# Patient Record
Sex: MALE | Race: OTHER | NOT HISPANIC OR LATINO
[De-identification: names, ages, dates, MRNs, and addresses within clinical notes are randomized per-mention and may not be internally consistent; named-entity substitution may affect disease eponyms.]

---

## 2020-11-18 ENCOUNTER — APPOINTMENT (OUTPATIENT)
Dept: INTERNAL MEDICINE | Facility: CLINIC | Age: 55
End: 2020-11-18
Payer: COMMERCIAL

## 2020-11-18 VITALS
DIASTOLIC BLOOD PRESSURE: 88 MMHG | SYSTOLIC BLOOD PRESSURE: 138 MMHG | BODY MASS INDEX: 28.79 KG/M2 | HEIGHT: 68 IN | WEIGHT: 190 LBS

## 2020-11-18 DIAGNOSIS — M17.11 UNILATERAL PRIMARY OSTEOARTHRITIS, RIGHT KNEE: ICD-10-CM

## 2020-11-18 DIAGNOSIS — Z80.0 FAMILY HISTORY OF MALIGNANT NEOPLASM OF DIGESTIVE ORGANS: ICD-10-CM

## 2020-11-18 DIAGNOSIS — Z83.3 FAMILY HISTORY OF DIABETES MELLITUS: ICD-10-CM

## 2020-11-18 DIAGNOSIS — Z87.09 PERSONAL HISTORY OF OTHER DISEASES OF THE RESPIRATORY SYSTEM: ICD-10-CM

## 2020-11-18 DIAGNOSIS — S46.919A STRAIN OF UNSPECIFIED MUSCLE, FASCIA AND TENDON AT SHOULDER AND UPPER ARM LEVEL, UNSPECIFIED ARM, INITIAL ENCOUNTER: ICD-10-CM

## 2020-11-18 DIAGNOSIS — Z87.39 PERSONAL HISTORY OF OTHER DISEASES OF THE MUSCULOSKELETAL SYSTEM AND CONNECTIVE TISSUE: ICD-10-CM

## 2020-11-18 DIAGNOSIS — Z78.9 OTHER SPECIFIED HEALTH STATUS: ICD-10-CM

## 2020-11-18 PROCEDURE — 99213 OFFICE O/P EST LOW 20 MIN: CPT

## 2020-11-18 NOTE — REVIEW OF SYSTEMS
[Fever] : no fever [Chills] : no chills [Chest Pain] : no chest pain [Palpitations] : no palpitations [Shortness Of Breath] : no shortness of breath [Cough] : no cough [Dyspnea on Exertion] : not dyspnea on exertion [Abdominal Pain] : no abdominal pain [Back Pain] : back pain

## 2020-11-18 NOTE — HISTORY OF PRESENT ILLNESS
[de-identified] : 56 y/o male is in the office c/o thoracic back pain for the past few days. Increased with movement and deep breathing. No obvious injury but does a lot of physical work.

## 2020-11-18 NOTE — PLAN
[FreeTextEntry1] : Was taking Tylenol.\par To try Motrin instead along with some degree of rest.\par F/U if not improved or worse

## 2020-11-18 NOTE — PHYSICAL EXAM
[No Respiratory Distress] : no respiratory distress  [Clear to Auscultation] : lungs were clear to auscultation bilaterally [Normal] : normal rate, regular rhythm, normal S1 and S2 and no murmur heard [No Carotid Bruits] : no carotid bruits [de-identified] : Some tenderness along left scapula margin

## 2021-04-05 ENCOUNTER — APPOINTMENT (OUTPATIENT)
Dept: INTERNAL MEDICINE | Facility: CLINIC | Age: 56
End: 2021-04-05
Payer: COMMERCIAL

## 2021-04-05 VITALS
DIASTOLIC BLOOD PRESSURE: 80 MMHG | BODY MASS INDEX: 28.79 KG/M2 | WEIGHT: 190 LBS | HEIGHT: 68 IN | SYSTOLIC BLOOD PRESSURE: 138 MMHG

## 2021-04-05 DIAGNOSIS — Z00.00 ENCOUNTER FOR GENERAL ADULT MEDICAL EXAMINATION W/OUT ABNORMAL FINDINGS: ICD-10-CM

## 2021-04-05 DIAGNOSIS — Z23 ENCOUNTER FOR IMMUNIZATION: ICD-10-CM

## 2021-04-05 PROCEDURE — 90471 IMMUNIZATION ADMIN: CPT

## 2021-04-05 PROCEDURE — 99072 ADDL SUPL MATRL&STAF TM PHE: CPT

## 2021-04-05 PROCEDURE — 99396 PREV VISIT EST AGE 40-64: CPT | Mod: 25

## 2021-04-05 PROCEDURE — 90715 TDAP VACCINE 7 YRS/> IM: CPT

## 2021-04-05 NOTE — REVIEW OF SYSTEMS
[Fever] : no fever [Chills] : no chills [Recent Change In Weight] : ~T no recent weight change [Chest Pain] : no chest pain [Palpitations] : no palpitations [Shortness Of Breath] : no shortness of breath [Abdominal Pain] : no abdominal pain [Diarrhea] : no diarrhea [Muscle Weakness] : no muscle weakness [Joint Swelling] : no joint swelling [Dizziness] : no dizziness [Fainting] : no fainting

## 2021-04-05 NOTE — HISTORY OF PRESENT ILLNESS
[de-identified] : 55 y/o male presents for annual wellness visit and follow-up fasting labs.\par He has a history of hyperlipidemia and hypertension and has been on Lipitor 10 mg and Norvasc 5 mg.  His blood pressure has been well controlled when checked.\par He denies any recent illness and has been generally well.\par He has a history of Covid in April 2020 and he is scheduled for his second Covid vaccine in 2 weeks.

## 2021-04-05 NOTE — PLAN
[FreeTextEntry1] : His exam is unremarkable.\par Fasting labs including PSA was sent.\par \par Hyperlipidemia–he will continue on Lipitor 10 mg for now and the dose can be adjusted based on the results.\par \par Hypertension–his blood pressure is well controlled and he will remain on Norvasc 5 mg daily.\par \par He received Tdap booster today.\par \par He has an appointment scheduled for initial visit for a colonoscopy.

## 2021-04-05 NOTE — PHYSICAL EXAM
[Normal] : no acute distress, well nourished, well developed and well-appearing [No JVD] : no jugular venous distention [No Lymphadenopathy] : no lymphadenopathy [Clear to Auscultation] : lungs were clear to auscultation bilaterally [Normal Rate] : normal rate  [Regular Rhythm] : with a regular rhythm [Non Tender] : non-tender [No Joint Swelling] : no joint swelling [No Focal Deficits] : no focal deficits [Speech Grossly Normal] : speech grossly normal

## 2021-04-06 LAB
ALBUMIN SERPL ELPH-MCNC: 4.6 G/DL
ALP BLD-CCNC: 88 U/L
ALT SERPL-CCNC: 34 U/L
ANION GAP SERPL CALC-SCNC: 12 MMOL/L
AST SERPL-CCNC: 29 U/L
BASOPHILS # BLD AUTO: 0.04 K/UL
BASOPHILS NFR BLD AUTO: 0.6 %
BILIRUB SERPL-MCNC: 0.5 MG/DL
BUN SERPL-MCNC: 11 MG/DL
CALCIUM SERPL-MCNC: 9.3 MG/DL
CHLORIDE SERPL-SCNC: 102 MMOL/L
CHOLEST SERPL-MCNC: 189 MG/DL
CO2 SERPL-SCNC: 24 MMOL/L
CREAT SERPL-MCNC: 1.03 MG/DL
EOSINOPHIL # BLD AUTO: 0.14 K/UL
EOSINOPHIL NFR BLD AUTO: 2.2 %
GLUCOSE SERPL-MCNC: 125 MG/DL
HCT VFR BLD CALC: 45.3 %
HDLC SERPL-MCNC: 52 MG/DL
HGB BLD-MCNC: 15.2 G/DL
IMM GRANULOCYTES NFR BLD AUTO: 0.6 %
LDLC SERPL CALC-MCNC: 111 MG/DL
LYMPHOCYTES # BLD AUTO: 2.9 K/UL
LYMPHOCYTES NFR BLD AUTO: 44.7 %
MAN DIFF?: NORMAL
MCHC RBC-ENTMCNC: 30.7 PG
MCHC RBC-ENTMCNC: 33.6 GM/DL
MCV RBC AUTO: 91.5 FL
MONOCYTES # BLD AUTO: 0.55 K/UL
MONOCYTES NFR BLD AUTO: 8.5 %
NEUTROPHILS # BLD AUTO: 2.82 K/UL
NEUTROPHILS NFR BLD AUTO: 43.4 %
NONHDLC SERPL-MCNC: 137 MG/DL
PLATELET # BLD AUTO: 255 K/UL
POTASSIUM SERPL-SCNC: 4.2 MMOL/L
PROT SERPL-MCNC: 7.2 G/DL
PSA SERPL-MCNC: 0.96 NG/ML
RBC # BLD: 4.95 M/UL
RBC # FLD: 11.8 %
SODIUM SERPL-SCNC: 139 MMOL/L
TRIGL SERPL-MCNC: 133 MG/DL
TSH SERPL-ACNC: 2.41 UIU/ML
WBC # FLD AUTO: 6.49 K/UL

## 2021-04-07 ENCOUNTER — APPOINTMENT (OUTPATIENT)
Dept: GASTROENTEROLOGY | Facility: CLINIC | Age: 56
End: 2021-04-07
Payer: COMMERCIAL

## 2021-04-07 VITALS
SYSTOLIC BLOOD PRESSURE: 135 MMHG | HEIGHT: 68 IN | BODY MASS INDEX: 28.79 KG/M2 | TEMPERATURE: 98 F | DIASTOLIC BLOOD PRESSURE: 95 MMHG | HEART RATE: 73 BPM | WEIGHT: 190 LBS

## 2021-04-07 PROCEDURE — 99202 OFFICE O/P NEW SF 15 MIN: CPT

## 2021-04-07 PROCEDURE — 99072 ADDL SUPL MATRL&STAF TM PHE: CPT

## 2021-04-07 NOTE — HISTORY OF PRESENT ILLNESS
[de-identified] : Mr. MANUELA FELDER is a 56 year old male with history of red blood on the toilet paper which occurs intermittently. Patient never had prior screening. Patient still complains of abdominal pain or bowel changes. Patient's father was diagnosed with colon cancer. No complaints of heartburn.\par

## 2021-04-07 NOTE — ASSESSMENT
[FreeTextEntry1] : 55 yo male screening colonoscopy with family history of colon cancer and history of rectal bleeding.

## 2021-04-08 ENCOUNTER — NON-APPOINTMENT (OUTPATIENT)
Age: 56
End: 2021-04-08

## 2021-04-08 ENCOUNTER — APPOINTMENT (OUTPATIENT)
Dept: OTOLARYNGOLOGY | Facility: CLINIC | Age: 56
End: 2021-04-08
Payer: COMMERCIAL

## 2021-04-08 VITALS
TEMPERATURE: 97.2 F | WEIGHT: 190 LBS | SYSTOLIC BLOOD PRESSURE: 129 MMHG | HEIGHT: 68 IN | DIASTOLIC BLOOD PRESSURE: 84 MMHG | HEART RATE: 80 BPM | BODY MASS INDEX: 28.79 KG/M2

## 2021-04-08 DIAGNOSIS — R51.9 HEADACHE, UNSPECIFIED: ICD-10-CM

## 2021-04-08 DIAGNOSIS — J32.0 CHRONIC MAXILLARY SINUSITIS: ICD-10-CM

## 2021-04-08 DIAGNOSIS — J34.3 HYPERTROPHY OF NASAL TURBINATES: ICD-10-CM

## 2021-04-08 DIAGNOSIS — Z12.11 ENCOUNTER FOR SCREENING FOR MALIGNANT NEOPLASM OF COLON: ICD-10-CM

## 2021-04-08 PROCEDURE — 31231 NASAL ENDOSCOPY DX: CPT

## 2021-04-08 PROCEDURE — 99072 ADDL SUPL MATRL&STAF TM PHE: CPT

## 2021-04-08 PROCEDURE — 99204 OFFICE O/P NEW MOD 45 MIN: CPT | Mod: 25

## 2021-04-08 NOTE — HISTORY OF PRESENT ILLNESS
[de-identified] : ? problem with sinuses.  Does have seasonal allergies.  Sneezes a lot.  Occ feels pain in right side of head with sneeze. Problem started about  6 mos ago.  C/o occ itch in nose.  Itch in eyes and nose.    Occ snores at night acc to wife.  Wife says ? apneic episodes.  Did have recent weight gain.  Does have alternating nasal congestion.

## 2021-04-08 NOTE — REVIEW OF SYSTEMS
[Sneezing] : sneezing [Seasonal Allergies] : seasonal allergies [Nasal Congestion] : nasal congestion [Sinus Pain] : sinus pain [Sinus Pressure] : sinus pressure [Throat Clearing] : throat clearing [Feeling Tired] : feeling tired [Negative] : Heme/Lymph

## 2021-04-08 NOTE — PHYSICAL EXAM
[Nasal Endoscopy Performed] : nasal endoscopy was performed, see procedure section for findings [] : septum deviated to the left [Midline] : trachea located in midline position [Normal] : no rashes [de-identified] : mod inferior turb hypertrophy bilat

## 2021-04-08 NOTE — ASSESSMENT
[FreeTextEntry1] : Patient with c/o sneezing and eye itch with alternating nasal  congestion.  No clinical evidence of sinusitis but patient does have severe right frontal headaches recently especially with sneezing.  Started on flonase and azelastine but will also get CT of sinuses due to concerns about headache. If negative may need neurology\par Patient may also have KWAME - hx of loud snoring and witnessed apnea.  Recommended HST and further treatment pending result.

## 2021-04-14 ENCOUNTER — APPOINTMENT (OUTPATIENT)
Dept: CT IMAGING | Facility: CLINIC | Age: 56
End: 2021-04-14
Payer: COMMERCIAL

## 2021-04-14 ENCOUNTER — OUTPATIENT (OUTPATIENT)
Dept: OUTPATIENT SERVICES | Facility: HOSPITAL | Age: 56
LOS: 1 days | End: 2021-04-14
Payer: COMMERCIAL

## 2021-04-14 DIAGNOSIS — R51.9 HEADACHE, UNSPECIFIED: ICD-10-CM

## 2021-04-14 DIAGNOSIS — J32.0 CHRONIC MAXILLARY SINUSITIS: ICD-10-CM

## 2021-04-14 DIAGNOSIS — G47.33 OBSTRUCTIVE SLEEP APNEA (ADULT) (PEDIATRIC): ICD-10-CM

## 2021-04-14 PROCEDURE — 70486 CT MAXILLOFACIAL W/O DYE: CPT

## 2021-04-14 PROCEDURE — 70486 CT MAXILLOFACIAL W/O DYE: CPT | Mod: 26

## 2021-04-15 ENCOUNTER — NON-APPOINTMENT (OUTPATIENT)
Age: 56
End: 2021-04-15

## 2021-05-29 DIAGNOSIS — Z01.818 ENCOUNTER FOR OTHER PREPROCEDURAL EXAMINATION: ICD-10-CM

## 2021-05-30 ENCOUNTER — APPOINTMENT (OUTPATIENT)
Dept: DISASTER EMERGENCY | Facility: CLINIC | Age: 56
End: 2021-05-30

## 2021-05-30 LAB — SARS-COV-2 N GENE NPH QL NAA+PROBE: NOT DETECTED

## 2021-06-03 ENCOUNTER — APPOINTMENT (OUTPATIENT)
Dept: GASTROENTEROLOGY | Facility: AMBULATORY MEDICAL SERVICES | Age: 56
End: 2021-06-03
Payer: COMMERCIAL

## 2021-06-03 PROCEDURE — 45378 DIAGNOSTIC COLONOSCOPY: CPT

## 2021-12-06 ENCOUNTER — APPOINTMENT (OUTPATIENT)
Dept: INTERNAL MEDICINE | Facility: CLINIC | Age: 56
End: 2021-12-06
Payer: COMMERCIAL

## 2021-12-06 VITALS — HEART RATE: 70 BPM | RESPIRATION RATE: 16 BRPM | SYSTOLIC BLOOD PRESSURE: 136 MMHG | DIASTOLIC BLOOD PRESSURE: 84 MMHG

## 2021-12-06 PROCEDURE — 99213 OFFICE O/P EST LOW 20 MIN: CPT

## 2021-12-06 NOTE — ASSESSMENT
[FreeTextEntry1] : Sinusitis–his symptoms have been persistent.  He also has discolored mucus.  He is given Omnicef 600 mg daily for 1 week.  He was advised rest and was given a note to return to work in 1 week.  He did have a Covid test done recently that was negative as well.

## 2021-12-06 NOTE — HISTORY OF PRESENT ILLNESS
[de-identified] : 56-year-old male presents complaining of persistent sinus and nasal congestion with facial pressure over the past 3 days.  He denies any fever chills but now has increasing discolored mucus production.

## 2021-12-06 NOTE — PHYSICAL EXAM
[No Acute Distress] : no acute distress [Normal Oropharynx] : the oropharynx was normal [No Lymphadenopathy] : no lymphadenopathy [Clear to Auscultation] : lungs were clear to auscultation bilaterally [Normal Rate] : normal rate  [Regular Rhythm] : with a regular rhythm [Normal S1, S2] : normal S1 and S2

## 2021-12-06 NOTE — REVIEW OF SYSTEMS
[Fever] : no fever [Chills] : no chills [Postnasal Drip] : postnasal drip [Nasal Discharge] : nasal discharge [Sore Throat] : no sore throat [Chest Pain] : no chest pain [Shortness Of Breath] : no shortness of breath [Cough] : no cough

## 2021-12-24 ENCOUNTER — RX RENEWAL (OUTPATIENT)
Age: 56
End: 2021-12-24

## 2021-12-29 ENCOUNTER — APPOINTMENT (OUTPATIENT)
Dept: INTERNAL MEDICINE | Facility: CLINIC | Age: 56
End: 2021-12-29
Payer: COMMERCIAL

## 2021-12-29 VITALS — SYSTOLIC BLOOD PRESSURE: 138 MMHG | DIASTOLIC BLOOD PRESSURE: 88 MMHG

## 2021-12-29 VITALS — WEIGHT: 182 LBS | HEIGHT: 68 IN | BODY MASS INDEX: 27.58 KG/M2

## 2021-12-29 DIAGNOSIS — R73.09 OTHER ABNORMAL GLUCOSE: ICD-10-CM

## 2021-12-29 PROCEDURE — 99214 OFFICE O/P EST MOD 30 MIN: CPT | Mod: 25

## 2021-12-29 NOTE — REVIEW OF SYSTEMS
[Fever] : no fever [Chest Pain] : no chest pain [Palpitations] : no palpitations [Abdominal Pain] : no abdominal pain [Headache] : no headache [Dizziness] : no dizziness

## 2021-12-29 NOTE — ASSESSMENT
[FreeTextEntry1] : Hypertension–his blood pressure remains controlled and he will continue with Norvasc 5 mg daily for now.  He was encouraged to monitor at home as well.\par \par Hyperlipidemia–follow-up lipid profile was sent and he will continue with Lipitor 10 mg daily.\par \par Previous elevated glucose–and A1c was sent in addition.  Weight loss with diet and exercise was discussed.\par \par History of Covid–he had Covid in 4/20.  He will be receiving his booster dose later today.

## 2021-12-29 NOTE — HISTORY OF PRESENT ILLNESS
[de-identified] : 57 y/o presents for follow up and fasting labs.\par He has a history of hyperlipidemia and hypertension.\par Has been generally well without any recent illness.

## 2021-12-29 NOTE — HEALTH RISK ASSESSMENT
[Never] : Never [Yes] : Yes [2 - 4 times a month (2 pts)] : 2-4 times a month (2 points) [1 or 2 (0 pts)] : 1 or 2 (0 points) [Never (0 pts)] : Never (0 points) [No] : In the past 12 months have you used drugs other than those required for medical reasons? No [0] : 2) Feeling down, depressed, or hopeless: Not at all (0) [PHQ-2 Negative - No further assessment needed] : PHQ-2 Negative - No further assessment needed [Audit-CScore] : 2 [[id number]] : 0

## 2021-12-30 LAB
ALBUMIN SERPL ELPH-MCNC: 4.7 G/DL
ALP BLD-CCNC: 80 U/L
ALT SERPL-CCNC: 30 U/L
ANION GAP SERPL CALC-SCNC: 17 MMOL/L
AST SERPL-CCNC: 31 U/L
BASOPHILS # BLD AUTO: 0.04 K/UL
BASOPHILS NFR BLD AUTO: 0.7 %
BILIRUB SERPL-MCNC: 0.5 MG/DL
BUN SERPL-MCNC: 14 MG/DL
CALCIUM SERPL-MCNC: 9 MG/DL
CHLORIDE SERPL-SCNC: 104 MMOL/L
CHOLEST SERPL-MCNC: 204 MG/DL
CO2 SERPL-SCNC: 21 MMOL/L
CREAT SERPL-MCNC: 1.01 MG/DL
EOSINOPHIL # BLD AUTO: 0.15 K/UL
EOSINOPHIL NFR BLD AUTO: 2.5 %
ESTIMATED AVERAGE GLUCOSE: 146 MG/DL
GLUCOSE SERPL-MCNC: 116 MG/DL
HBA1C MFR BLD HPLC: 6.7 %
HCT VFR BLD CALC: 44.7 %
HDLC SERPL-MCNC: 52 MG/DL
HGB BLD-MCNC: 15.3 G/DL
IMM GRANULOCYTES NFR BLD AUTO: 0.3 %
LDLC SERPL CALC-MCNC: 112 MG/DL
LYMPHOCYTES # BLD AUTO: 2.45 K/UL
LYMPHOCYTES NFR BLD AUTO: 40.1 %
MAN DIFF?: NORMAL
MCHC RBC-ENTMCNC: 30.9 PG
MCHC RBC-ENTMCNC: 34.2 GM/DL
MCV RBC AUTO: 90.3 FL
MONOCYTES # BLD AUTO: 0.47 K/UL
MONOCYTES NFR BLD AUTO: 7.7 %
NEUTROPHILS # BLD AUTO: 2.98 K/UL
NEUTROPHILS NFR BLD AUTO: 48.7 %
NONHDLC SERPL-MCNC: 152 MG/DL
PLATELET # BLD AUTO: 236 K/UL
POTASSIUM SERPL-SCNC: 4.2 MMOL/L
PROT SERPL-MCNC: 7.3 G/DL
RBC # BLD: 4.95 M/UL
RBC # FLD: 11.8 %
SODIUM SERPL-SCNC: 143 MMOL/L
TRIGL SERPL-MCNC: 201 MG/DL
WBC # FLD AUTO: 6.11 K/UL

## 2022-02-28 ENCOUNTER — APPOINTMENT (OUTPATIENT)
Dept: INTERNAL MEDICINE | Facility: CLINIC | Age: 57
End: 2022-02-28
Payer: COMMERCIAL

## 2022-02-28 VITALS — SYSTOLIC BLOOD PRESSURE: 138 MMHG | DIASTOLIC BLOOD PRESSURE: 86 MMHG

## 2022-02-28 PROCEDURE — 99213 OFFICE O/P EST LOW 20 MIN: CPT

## 2022-02-28 NOTE — ASSESSMENT
[FreeTextEntry1] : Intermittent rectal bleeding/history of hemorrhoids–this has been an ongoing problem but it does seem to be worse recently.  He feels as though the hemorrhoid prolapses during bowel movements.\par He is given Anusol HC suppositories and will use 1 twice daily for the next 7-10 days.\par He is also going to make an appointment with colorectal specialist for further evaluation.

## 2022-02-28 NOTE — HISTORY OF PRESENT ILLNESS
[de-identified] : 57 y/o presents complaining of intermittent rectal bleeding over the past few weeks.  He does have a history of hemorrhoids and they have bled in the past.  He did have a colonoscopy 2021 that did reveal the presence of hemorrhoids.

## 2022-03-04 ENCOUNTER — APPOINTMENT (OUTPATIENT)
Dept: COLORECTAL SURGERY | Facility: CLINIC | Age: 57
End: 2022-03-04
Payer: COMMERCIAL

## 2022-03-04 VITALS
WEIGHT: 180 LBS | BODY MASS INDEX: 27.28 KG/M2 | HEIGHT: 68 IN | HEART RATE: 72 BPM | DIASTOLIC BLOOD PRESSURE: 79 MMHG | TEMPERATURE: 97.1 F | SYSTOLIC BLOOD PRESSURE: 136 MMHG

## 2022-03-04 PROCEDURE — 46221 LIGATION OF HEMORRHOID(S): CPT

## 2022-03-04 PROCEDURE — 99204 OFFICE O/P NEW MOD 45 MIN: CPT | Mod: 25

## 2022-03-04 NOTE — ASSESSMENT
[FreeTextEntry1] : 56M Grade II hemorrhoids, with intermittent prolapse and bleeding. \par Grade 2 left lateral and right posterior hemorrhoids. Grade 1 right anterior hemorrhoid. Rubber band ligation performed on each hemorrhoid column. A total of 3 bands placed.\par \par Plan of care for Hemorrhoids\par Add daily fiber supplementation to diet, Metamucil, Benefiber, or fiber supplement of preference\par Daily over the counter stool softener (eg.Colace) to prevent straining\par Increased fluid intake, preferably water\par limit their intake of spicy foods, fatty foods and alcohol\par Refrain from straining or lingering (eg. reading) on the toilet\par Warm sitz bath after bowel movements, no more than 3/day, do not exceed 10 minutes per sitz bath\par Post band ligation instruction given, if worsening pain, fevers, or trouble urinating, patient to call office to arrange for urgent re-evaluation.\par Patient aware banding may be needed over several office visits\par Follow up in 4 weeks for evaluation.\par \par \par \par \par \par

## 2022-03-04 NOTE — PHYSICAL EXAM
[Abdomen Tenderness] : ~T No ~M abdominal tenderness [Normal rectal exam] : exam was normal [Excoriation] : no perianal excoriation [Fistula] : no fistulas [Tender, Swollen] : nontender, non-swollen [Normal] : was normal [None] : there was no rectal abscess [Gross Blood] : no gross blood [Alert] : alert [Oriented to Person] : oriented to person [Oriented to Place] : oriented to place [Oriented to Time] : oriented to time [Calm] : calm [de-identified] : ALON: Non-tender, enlarged hemorrhoids appreciated [de-identified] : Grade 2 internal hemorrhoids [de-identified] : NAD [de-identified] : Non-labored respiration [de-identified] : Normal rate

## 2022-03-04 NOTE — CONSULT LETTER
[Dear  ___] : Dear  [unfilled], [Consult Letter:] : I had the pleasure of evaluating your patient, [unfilled]. [Please see my note below.] : Please see my note below. [Consult Closing:] : Thank you very much for allowing me to participate in the care of this patient.  If you have any questions, please do not hesitate to contact me. [Sincerely,] : Sincerely, [FreeTextEntry2] : TANNER SMITH [FreeTextEntry3] : Matti Doll MD\par Colon and Rectal Surgery\par

## 2022-03-04 NOTE — PROCEDURE
[FreeTextEntry1] : Anoscopy and rubber band hemorrhoid ligation\par \par I discussed the reason for the procedure, and the risks and benefits with the patient. Risks including bleeding and discomfort were discussed. The patient understood or discussion and would like to proceed with the procedure.\par \par After completing a digital rectal exam a well lubricated anoscope was gently inserted into the anus. Complete inspection was performed. No tenderness on insertion of the anoscope, and the procedure was tolerated well. No fissures, fistula openings, or masses were identified.\par \par Findings: Grade 2 left lateral and right posterior hemorrhoids. Grade 1 right anterior hemorrhoid.\par Rubber band ligation performed on each hemorrhoid column. A total of 3 bands placed\par

## 2022-03-04 NOTE — HISTORY OF PRESENT ILLNESS
[FreeTextEntry1] : 56M who presents today for evaluation of Intermittent rectal bleeding. Patient has a history of hemorrhoid bleeding which has been an ongoing issue. Patient reports hemorrhoidal prolapse during bowel movements which spontaneously reduces. He also reports noticing drops of blood in the toilet and on the toilet paper. Patient denies any pain, denies constipation. Does report eating very spicy foods.\par Colonoscopy 6/2021: Diverticulosis of the sigmoid colon, hemorrhoids\par \par \par \par

## 2022-03-09 ENCOUNTER — APPOINTMENT (OUTPATIENT)
Dept: GASTROENTEROLOGY | Facility: CLINIC | Age: 57
End: 2022-03-09
Payer: COMMERCIAL

## 2022-03-09 VITALS
SYSTOLIC BLOOD PRESSURE: 133 MMHG | DIASTOLIC BLOOD PRESSURE: 76 MMHG | WEIGHT: 180 LBS | BODY MASS INDEX: 27.28 KG/M2 | HEART RATE: 76 BPM | HEIGHT: 68 IN

## 2022-03-09 DIAGNOSIS — K62.5 HEMORRHAGE OF ANUS AND RECTUM: ICD-10-CM

## 2022-03-09 PROCEDURE — 99212 OFFICE O/P EST SF 10 MIN: CPT

## 2022-03-09 NOTE — ASSESSMENT
[FreeTextEntry1] : 57 yo male with history of resolved rectal bleeding after hemorrhoidal banding. Patient advised to continue stool softeners and call with any further bleeding.

## 2022-03-09 NOTE — HISTORY OF PRESENT ILLNESS
[de-identified] : Mr. MANUELA FELDER is a 56 year old male with history of painless rectal bleeding. Patient had colonoscopy in 2021 which demonstrated internal hemorrhoids. Patient had noted some painless rectal bleeding and underwent hemorrhoidal banding by Colorectal Surgery. Bleeding has since resolved.\par

## 2022-03-16 ENCOUNTER — NON-APPOINTMENT (OUTPATIENT)
Age: 57
End: 2022-03-16

## 2022-03-24 ENCOUNTER — RX RENEWAL (OUTPATIENT)
Age: 57
End: 2022-03-24

## 2022-03-25 ENCOUNTER — TRANSCRIPTION ENCOUNTER (OUTPATIENT)
Age: 57
End: 2022-03-25

## 2022-04-06 ENCOUNTER — APPOINTMENT (OUTPATIENT)
Dept: COLORECTAL SURGERY | Facility: CLINIC | Age: 57
End: 2022-04-06
Payer: COMMERCIAL

## 2022-04-06 VITALS
BODY MASS INDEX: 27.58 KG/M2 | SYSTOLIC BLOOD PRESSURE: 142 MMHG | WEIGHT: 182 LBS | TEMPERATURE: 96.8 F | HEIGHT: 68 IN | DIASTOLIC BLOOD PRESSURE: 84 MMHG | HEART RATE: 67 BPM | OXYGEN SATURATION: 97 % | RESPIRATION RATE: 14 BRPM

## 2022-04-06 PROCEDURE — 46221 LIGATION OF HEMORRHOID(S): CPT

## 2022-04-06 PROCEDURE — 99214 OFFICE O/P EST MOD 30 MIN: CPT | Mod: 25

## 2022-04-06 NOTE — PROCEDURE
[FreeTextEntry1] : Anoscopy and rubber band hemorrhoid ligation\par \par I discussed the reason for the procedure, and the risks and benefits with the patient. Risks including bleeding and discomfort were discussed. The patient understood or discussion and would like to proceed with the procedure.\par \par After completing a digital rectal exam a well lubricated anoscope was gently inserted into the anus. Complete inspection was performed. No tenderness on insertion of the anoscope, and the procedure was tolerated well. No fissures, fistula openings, or masses were identified.\par \par Findings: Enlarged left lateral, right posterior and right anterior hemorrhoids. \par Rubber band ligation performed on each hemorrhoid column. A total of 3 bands placed\par

## 2022-04-06 NOTE — HISTORY OF PRESENT ILLNESS
[FreeTextEntry1] : 4/6/2022 - Patient presents today for follow up after hemorrhoidal banding. Patient reports no issues since banding procedure in the office, he denies, prolapse, denies bleeding, denies pain. He has added a stool softener and has increased fiber intake in his diet No other issues reported.\par \par 3/4/2022 - 56M who presents today for evaluation of Intermittent rectal bleeding. Patient has a history of hemorrhoid bleeding which has been an ongoing issue. Patient reports hemorrhoidal prolapse during bowel movements which spontaneously reduces. He also reports noticing drops of blood in the toilet and on the toilet paper. Patient denies any pain, denies constipation. Does report eating very spicy foods.\par Colonoscopy 6/2021: Diverticulosis of the sigmoid colon, hemorrhoids\par \par \par \par

## 2022-04-06 NOTE — PHYSICAL EXAM
[Normal rectal exam] : exam was normal [Normal] : was normal [None] : there was no rectal abscess [Alert] : alert [Oriented to Person] : oriented to person [Oriented to Place] : oriented to place [Oriented to Time] : oriented to time [Calm] : calm [Abdomen Tenderness] : ~T No ~M abdominal tenderness [Excoriation] : no perianal excoriation [Fistula] : no fistulas [Nonprolapsing] : a nonprolapsing (grade I) [Tender, Swollen] : nontender, non-swollen [Gross Blood] : no gross blood [de-identified] : ALON: Non-tender, enlarged hemorrhoids appreciated [de-identified] : Enlarged internal hemorrhoids [de-identified] : NAD [de-identified] : Non-labored respiration [de-identified] : Normal rate

## 2022-04-21 ENCOUNTER — NON-APPOINTMENT (OUTPATIENT)
Age: 57
End: 2022-04-21

## 2022-04-22 ENCOUNTER — NON-APPOINTMENT (OUTPATIENT)
Age: 57
End: 2022-04-22

## 2022-04-22 ENCOUNTER — APPOINTMENT (OUTPATIENT)
Dept: CARDIOLOGY | Facility: CLINIC | Age: 57
End: 2022-04-22
Payer: COMMERCIAL

## 2022-04-22 VITALS
WEIGHT: 178 LBS | HEART RATE: 64 BPM | BODY MASS INDEX: 27.07 KG/M2 | SYSTOLIC BLOOD PRESSURE: 153 MMHG | OXYGEN SATURATION: 96 % | DIASTOLIC BLOOD PRESSURE: 86 MMHG

## 2022-04-22 PROCEDURE — 99245 OFF/OP CONSLTJ NEW/EST HI 55: CPT

## 2022-04-22 PROCEDURE — 93000 ELECTROCARDIOGRAM COMPLETE: CPT

## 2022-04-22 NOTE — HISTORY OF PRESENT ILLNESS
[FreeTextEntry1] : 57 year old male with hx of hypertension hyperlipidemia came for cardiac evaluation , Patient denies any chest pain or shortness of breath , patient does not do regular exercise ,  his blood pressure is elevated , not very compliant to low salt diet , his previous blood pressure readings were also mild elevated ,  patient blood work showed improving lipids , however still elevated on current dose of medication\par \par His ekg done today showed  T wave inversion  which are new compared to old EKG done in 2008 \par \par his blood work showed increase hemoglobin A1c   recent 6.6  patient has family hx of DM \par \par

## 2022-04-22 NOTE — ASSESSMENT
[FreeTextEntry1] : 57 year old male with above hx\par \par \par Abnormal EKG ( new T wave inversion ) without chest pain , possible hypertensive heart disease , rule out hypertrophic heart disease , will obtain echocardiogram to assess ventricular wall thickness and function , will obtain exercise nuclear stress test  ( baseline ST T EKG changes )  to rule out significant ischemia .\par \par \par DM type II , : advised the patient to follow diet restriction , life style modification including exercise , will start him on metformin 500 mg po daily follow up blood work \par \par HTN : uncontrolled ,  encourage the patient to follow low salt diet and increase norvasc to 10 mg po daily , home bP monitoring \par \par Hyperlipidemia : uncontrolled , advised the patient to follow life style modification , including exercise , diet restriction , increase atorvastatin to 20 mg po daily target LDL < 80 .\par \par follow up after above test

## 2022-04-22 NOTE — CARDIOLOGY SUMMARY
[de-identified] : 4/22/22 sinus bradycardia  low Qrs voltage ,  T wave inversion inferior , V4 to V6   ( new compared to ekg in 2008 )

## 2022-04-27 ENCOUNTER — APPOINTMENT (OUTPATIENT)
Dept: INTERNAL MEDICINE | Facility: CLINIC | Age: 57
End: 2022-04-27

## 2022-05-17 ENCOUNTER — NON-APPOINTMENT (OUTPATIENT)
Age: 57
End: 2022-05-17

## 2022-05-18 ENCOUNTER — APPOINTMENT (OUTPATIENT)
Dept: COLORECTAL SURGERY | Facility: CLINIC | Age: 57
End: 2022-05-18
Payer: COMMERCIAL

## 2022-05-18 VITALS
DIASTOLIC BLOOD PRESSURE: 88 MMHG | BODY MASS INDEX: 26.98 KG/M2 | HEART RATE: 60 BPM | WEIGHT: 178 LBS | OXYGEN SATURATION: 98 % | TEMPERATURE: 97.2 F | SYSTOLIC BLOOD PRESSURE: 144 MMHG | RESPIRATION RATE: 15 BRPM | HEIGHT: 68 IN

## 2022-05-18 PROCEDURE — 99213 OFFICE O/P EST LOW 20 MIN: CPT | Mod: 25

## 2022-05-18 PROCEDURE — 46221 LIGATION OF HEMORRHOID(S): CPT

## 2022-05-18 NOTE — HISTORY OF PRESENT ILLNESS
[FreeTextEntry1] : 5/18/2022 - Patient presents today for follow up after hemorrhoidal banding. No issues since banding procedure in the office, he denies any bleeding or pain. Stools have been soft, no constipation or straining. No new complaints\par \par 4/6/2022 - Patient presents today for follow up after hemorrhoidal banding. Patient reports no issues since banding procedure in the office, he denies, prolapse, denies bleeding, denies pain. He has added a stool softener and has increased fiber intake in his diet No other issues reported.\par \par 3/4/2022 - 56M who presents today for evaluation of Intermittent rectal bleeding. Patient has a history of hemorrhoid bleeding which has been an ongoing issue. Patient reports hemorrhoidal prolapse during bowel movements which spontaneously reduces. He also reports noticing drops of blood in the toilet and on the toilet paper. Patient denies any pain, denies constipation. Does report eating very spicy foods.\par Colonoscopy 6/2021: Diverticulosis of the sigmoid colon, hemorrhoids\par \par \par \par

## 2022-05-18 NOTE — ASSESSMENT
[FreeTextEntry1] : 56M Patients symptoms have improved since initial evaluation, Hemorrhoids are smaller on examination today than on prior evaluations.\par \par Findings: Enlarged left lateral, right posterior and right anterior hemorrhoids. \par Rubber band ligation performed on each hemorrhoid column. A total of 3 bands placed\par \par Plan of care for Hemorrhoids\par Continue with daily fiber supplementation to diet, Metamucil, Benefiber, or fiber supplement of preference\par Continue daily over the counter stool softener (eg.Colace) to prevent straining\par Continue increased fluid intake, preferably water\par Refrain from straining or lingering (eg. reading) on the toilet\par Post band ligation instruction given, if worsening pain, fevers, or trouble urinating, patient to call office to arrange for urgent re-evaluation.\par Follow up in 4 weeks for evaluation.\par \par \par \par \par \par

## 2022-05-18 NOTE — PHYSICAL EXAM
[Normal rectal exam] : exam was normal [Nonprolapsing] : a nonprolapsing (grade I) [Normal] : was normal [None] : there was no rectal abscess [Alert] : alert [Oriented to Person] : oriented to person [Oriented to Place] : oriented to place [Oriented to Time] : oriented to time [Calm] : calm [Abdomen Tenderness] : ~T No ~M abdominal tenderness [Excoriation] : no perianal excoriation [Fistula] : no fistulas [Tender, Swollen] : nontender, non-swollen [Gross Blood] : no gross blood [de-identified] : ALON: Non-tender, enlarged hemorrhoids appreciated [de-identified] : Enlarged internal hemorrhoids [de-identified] : NAD [de-identified] : Non-labored respiration [de-identified] : Normal rate

## 2022-05-19 ENCOUNTER — APPOINTMENT (OUTPATIENT)
Dept: CARDIOLOGY | Facility: CLINIC | Age: 57
End: 2022-05-19
Payer: COMMERCIAL

## 2022-05-19 PROCEDURE — 78452 HT MUSCLE IMAGE SPECT MULT: CPT

## 2022-05-19 PROCEDURE — 93015 CV STRESS TEST SUPVJ I&R: CPT

## 2022-05-19 PROCEDURE — A9500: CPT

## 2022-05-23 ENCOUNTER — APPOINTMENT (OUTPATIENT)
Dept: CARDIOLOGY | Facility: CLINIC | Age: 57
End: 2022-05-23
Payer: COMMERCIAL

## 2022-05-23 PROCEDURE — 93306 TTE W/DOPPLER COMPLETE: CPT

## 2022-06-02 ENCOUNTER — APPOINTMENT (OUTPATIENT)
Dept: CARDIOLOGY | Facility: CLINIC | Age: 57
End: 2022-06-02
Payer: COMMERCIAL

## 2022-06-02 ENCOUNTER — NON-APPOINTMENT (OUTPATIENT)
Age: 57
End: 2022-06-02

## 2022-06-02 VITALS — SYSTOLIC BLOOD PRESSURE: 150 MMHG | DIASTOLIC BLOOD PRESSURE: 84 MMHG

## 2022-06-02 VITALS
SYSTOLIC BLOOD PRESSURE: 145 MMHG | BODY MASS INDEX: 26.52 KG/M2 | OXYGEN SATURATION: 98 % | DIASTOLIC BLOOD PRESSURE: 85 MMHG | HEIGHT: 68 IN | WEIGHT: 175 LBS | HEART RATE: 70 BPM

## 2022-06-02 PROCEDURE — 93000 ELECTROCARDIOGRAM COMPLETE: CPT

## 2022-06-02 PROCEDURE — 99215 OFFICE O/P EST HI 40 MIN: CPT

## 2022-06-02 RX ORDER — ERGOCALCIFEROL 1.25 MG/1
1.25 MG CAPSULE ORAL
Qty: 7 | Refills: 0 | Status: ACTIVE | COMMUNITY
Start: 2022-06-02 | End: 1900-01-01

## 2022-06-02 NOTE — HISTORY OF PRESENT ILLNESS
[FreeTextEntry1] : 57 year old male with hx of hypertension hyperlipidemia  abnormal ekg came for follow up after having recommended tests  for new ekg changes ,   echo showed normal study , normal exercise stress test , \par \par   his blood pressure is elevated , not very compliant to low salt diet , his previous blood pressure readings were also mild elevated ,did not taking medication ,   patient blood work showed improving lipids , however still elevated on current dose of medication\par \par His ekg done today showed  T wave inversion  which are new compared to old EKG done in 2008  for which patient had  work up done recently \par \par his blood work showed increase hemoglobin A1c   recent 6.5  patient has family hx of DM , lipid profile  LDL 98 \par very low vitamin D \par \par Patient did not want to have sleep apnea  patient snoring \par

## 2022-06-02 NOTE — ASSESSMENT
[FreeTextEntry1] : 57 year old male with above hx\par \par \par Abnormal EKG ( new T wave inversion ) without chest pain , possible hypertensive heart disease , no obvious LVH on echo , normal ventricular function ,normal exercise stress test , \par \par DM type II , : advised the patient to follow diet restriction , life style modification including exercise , will continue  him on metformin 500 mg po daily follow up blood work \par \par HTN : uncontrolled ,  encourage the patient to follow low salt diet , did not take medication and increase norvasc to 10 mg po daily , home bP monitoring \par \par Hyperlipidemia : uncontrolled , advised the patient to follow life style modification , including exercise , diet restriction , continue  atorvastatin to 20 mg po daily target LDL < 80 .\par \par Low vitamin D : will give high dose vitamin D 50 K weekly for 6 weeks then  1k daily , \par \par snoring : recommend sleep study \par \par follow up after above test

## 2022-06-02 NOTE — CARDIOLOGY SUMMARY
[de-identified] : 6/2/22 sinus bradycardia  low Qrs voltage ,  T wave inversion inferior , V4 to V6   ( no significant change from ekg april 2022 )  [de-identified] : 5/19/22 9 METS 90% MPHR  normal nuclear perfusion scan  [de-identified] : 5/23/22   EF 58%

## 2022-06-03 LAB
25(OH)D3 SERPL-MCNC: 14.8 NG/ML
ALBUMIN SERPL ELPH-MCNC: 4.6 G/DL
ALP BLD-CCNC: 79 U/L
ALT SERPL-CCNC: 20 U/L
ANION GAP SERPL CALC-SCNC: 13 MMOL/L
APPEARANCE: CLEAR
AST SERPL-CCNC: 21 U/L
BASOPHILS # BLD AUTO: 0.05 K/UL
BASOPHILS NFR BLD AUTO: 0.8 %
BILIRUB SERPL-MCNC: 0.4 MG/DL
BILIRUBIN URINE: NEGATIVE
BLOOD URINE: NEGATIVE
BUN SERPL-MCNC: 16 MG/DL
CALCIUM SERPL-MCNC: 8.9 MG/DL
CHLORIDE SERPL-SCNC: 104 MMOL/L
CHOLEST SERPL-MCNC: 171 MG/DL
CO2 SERPL-SCNC: 25 MMOL/L
COLOR: NORMAL
CREAT SERPL-MCNC: 1.03 MG/DL
EGFR: 86 ML/MIN/1.73M2
EOSINOPHIL # BLD AUTO: 0.16 K/UL
EOSINOPHIL NFR BLD AUTO: 2.4 %
ESTIMATED AVERAGE GLUCOSE: 140 MG/DL
GLUCOSE QUALITATIVE U: NEGATIVE
GLUCOSE SERPL-MCNC: 108 MG/DL
HBA1C MFR BLD HPLC: 6.5 %
HCT VFR BLD CALC: 44.7 %
HDLC SERPL-MCNC: 47 MG/DL
HGB BLD-MCNC: 14.8 G/DL
IMM GRANULOCYTES NFR BLD AUTO: 0.5 %
KETONES URINE: NEGATIVE
LDLC SERPL CALC-MCNC: 98 MG/DL
LEUKOCYTE ESTERASE URINE: NEGATIVE
LYMPHOCYTES # BLD AUTO: 2.83 K/UL
LYMPHOCYTES NFR BLD AUTO: 42.5 %
MAN DIFF?: NORMAL
MCHC RBC-ENTMCNC: 30.1 PG
MCHC RBC-ENTMCNC: 33.1 GM/DL
MCV RBC AUTO: 91 FL
MONOCYTES # BLD AUTO: 0.52 K/UL
MONOCYTES NFR BLD AUTO: 7.8 %
NEUTROPHILS # BLD AUTO: 3.07 K/UL
NEUTROPHILS NFR BLD AUTO: 46 %
NITRITE URINE: NEGATIVE
NONHDLC SERPL-MCNC: 124 MG/DL
PH URINE: 6
PLATELET # BLD AUTO: 234 K/UL
POTASSIUM SERPL-SCNC: 4.5 MMOL/L
PROT SERPL-MCNC: 7.1 G/DL
PROTEIN URINE: NEGATIVE
RBC # BLD: 4.91 M/UL
RBC # FLD: 12.2 %
SODIUM SERPL-SCNC: 141 MMOL/L
SPECIFIC GRAVITY URINE: 1.02
TRIGL SERPL-MCNC: 128 MG/DL
TSH SERPL-ACNC: 1.76 UIU/ML
UROBILINOGEN URINE: NORMAL
WBC # FLD AUTO: 6.66 K/UL

## 2022-06-15 ENCOUNTER — APPOINTMENT (OUTPATIENT)
Dept: COLORECTAL SURGERY | Facility: CLINIC | Age: 57
End: 2022-06-15
Payer: COMMERCIAL

## 2022-06-15 VITALS
DIASTOLIC BLOOD PRESSURE: 74 MMHG | SYSTOLIC BLOOD PRESSURE: 113 MMHG | HEIGHT: 68 IN | BODY MASS INDEX: 26.52 KG/M2 | RESPIRATION RATE: 15 BRPM | OXYGEN SATURATION: 96 % | HEART RATE: 63 BPM | TEMPERATURE: 97 F | WEIGHT: 175 LBS

## 2022-06-15 DIAGNOSIS — K64.9 UNSPECIFIED HEMORRHOIDS: ICD-10-CM

## 2022-06-15 PROCEDURE — 46221 LIGATION OF HEMORRHOID(S): CPT

## 2022-06-15 PROCEDURE — 99213 OFFICE O/P EST LOW 20 MIN: CPT | Mod: 25

## 2022-06-15 NOTE — HISTORY OF PRESENT ILLNESS
[FreeTextEntry1] : 6/15/2022 - Patient presents today for follow up. No issues since last seen i the office. Tolerating a normal diet, having soft stools, no episodes of constipation of bleeding.\par \par 5/18/2022 - Patient presents today for follow up after hemorrhoidal banding. No issues since banding procedure in the office, he denies any bleeding or pain. Stools have been soft, no constipation or straining. No new complaints\par \par 4/6/2022 - Patient presents today for follow up after hemorrhoidal banding. Patient reports no issues since banding procedure in the office, he denies, prolapse, denies bleeding, denies pain. He has added a stool softener and has increased fiber intake in his diet No other issues reported.\par \par 3/4/2022 - 56M who presents today for evaluation of Intermittent rectal bleeding. Patient has a history of hemorrhoid bleeding which has been an ongoing issue. Patient reports hemorrhoidal prolapse during bowel movements which spontaneously reduces. He also reports noticing drops of blood in the toilet and on the toilet paper. Patient denies any pain, denies constipation. Does report eating very spicy foods.\par Colonoscopy 6/2021: Diverticulosis of the sigmoid colon, hemorrhoids\par \par \par \par

## 2022-06-15 NOTE — PROCEDURE
[FreeTextEntry1] : Anoscopy and rubber band hemorrhoid ligation\par \par I discussed the reason for the procedure, and the risks and benefits with the patient. Risks including bleeding and discomfort were discussed. The patient understood or discussion and would like to proceed with the procedure.\par \par After completing a digital rectal exam a well lubricated anoscope was gently inserted into the anus. Complete inspection was performed. No tenderness on insertion of the anoscope, and the procedure was tolerated well. No fissures, fistula openings, or masses were identified.\par \par Findings: Enlarged left lateral, right posterior hemorrhoids. \par Rubber band ligation performed on each hemorrhoid column. A total of 2 bands placed\par

## 2022-06-15 NOTE — PHYSICAL EXAM
[Normal rectal exam] : exam was normal [Nonprolapsing] : a nonprolapsing (grade I) [Normal] : was normal [None] : there was no rectal abscess [Alert] : alert [Oriented to Person] : oriented to person [Oriented to Place] : oriented to place [Oriented to Time] : oriented to time [Calm] : calm [Abdomen Tenderness] : ~T No ~M abdominal tenderness [Excoriation] : no perianal excoriation [Fistula] : no fistulas [Tender, Swollen] : nontender, non-swollen [Gross Blood] : no gross blood [de-identified] : ALON: Non-tender, no gross blood [de-identified] : Normal external examination [de-identified] : Enlarged internal hemorrhoids [de-identified] : NAD [de-identified] : Non-labored respiration [de-identified] : Normal rate

## 2022-07-20 ENCOUNTER — APPOINTMENT (OUTPATIENT)
Dept: COLORECTAL SURGERY | Facility: CLINIC | Age: 57
End: 2022-07-20

## 2022-07-20 VITALS
HEIGHT: 68 IN | BODY MASS INDEX: 26.67 KG/M2 | WEIGHT: 176 LBS | TEMPERATURE: 97.8 F | SYSTOLIC BLOOD PRESSURE: 145 MMHG | DIASTOLIC BLOOD PRESSURE: 91 MMHG | OXYGEN SATURATION: 98 % | RESPIRATION RATE: 14 BRPM | HEART RATE: 69 BPM

## 2022-07-20 DIAGNOSIS — K64.1 SECOND DEGREE HEMORRHOIDS: ICD-10-CM

## 2022-07-20 PROCEDURE — 99213 OFFICE O/P EST LOW 20 MIN: CPT | Mod: 25

## 2022-07-20 PROCEDURE — 46221 LIGATION OF HEMORRHOID(S): CPT

## 2022-07-20 NOTE — PHYSICAL EXAM
[Normal rectal exam] : exam was normal [Nonprolapsing] : a nonprolapsing (grade I) [Normal] : was normal [None] : there was no rectal abscess [Alert] : alert [Oriented to Person] : oriented to person [Oriented to Place] : oriented to place [Oriented to Time] : oriented to time [Calm] : calm [Abdomen Tenderness] : ~T No ~M abdominal tenderness [Excoriation] : no perianal excoriation [Fistula] : no fistulas [Tender, Swollen] : nontender, non-swollen [Gross Blood] : no gross blood [de-identified] : ALON: Non-tender, no gross blood [de-identified] : Normal external examination [de-identified] : NAD [de-identified] : Non-labored respiration [de-identified] : Normal rate

## 2022-07-20 NOTE — ASSESSMENT
[FreeTextEntry1] : 56M Patients symptoms have improved since initial evaluation, Hemorrhoids are smaller on examination today than on prior evaluations.\par Findings: small right posterior hemorrhoids. Rubber band ligation performed x 1 \par \par Plan of care for Hemorrhoids\par Continue with daily fiber supplementation to diet, Metamucil, Benefiber, or fiber supplement of preference\par Continue daily over the counter stool softener (eg.Colace) to prevent straining\par Continue increased fluid intake, preferably water\par Refrain from straining or lingering (eg. reading) on the toilet\par Post band ligation instruction given, if worsening pain, fevers, or trouble urinating, patient to call office to arrange for urgent re-evaluation.\par Follow up as needed\par \par \par \par \par \par

## 2022-07-20 NOTE — PROCEDURE
[FreeTextEntry1] : Anoscopy and rubber band hemorrhoid ligation\par \par I discussed the reason for the procedure, and the risks and benefits with the patient. Risks including bleeding and discomfort were discussed. The patient understood or discussion and would like to proceed with the procedure.\par \par After completing a digital rectal exam a well lubricated anoscope was gently inserted into the anus. Complete inspection was performed. No tenderness on insertion of the anoscope, and the procedure was tolerated well. No fissures, fistula openings, or masses were identified.\par \par Findings: small right posterior hemorrhoids. \par Rubber band ligation performed x 1

## 2022-07-20 NOTE — HISTORY OF PRESENT ILLNESS
[FreeTextEntry1] : Patient presents today for follow up. No issues since last seen in the office. Tolerating a normal diet, having soft stools, no episodes of constipation or bleeding.\par \par \par \par

## 2022-08-08 ENCOUNTER — OUTPATIENT (OUTPATIENT)
Dept: OUTPATIENT SERVICES | Facility: HOSPITAL | Age: 57
LOS: 1 days | End: 2022-08-08
Payer: COMMERCIAL

## 2022-08-08 DIAGNOSIS — G47.33 OBSTRUCTIVE SLEEP APNEA (ADULT) (PEDIATRIC): ICD-10-CM

## 2022-08-08 PROCEDURE — 95800 SLP STDY UNATTENDED: CPT

## 2022-09-07 ENCOUNTER — APPOINTMENT (OUTPATIENT)
Dept: FAMILY MEDICINE | Facility: CLINIC | Age: 57
End: 2022-09-07

## 2022-09-07 VITALS
BODY MASS INDEX: 27.43 KG/M2 | DIASTOLIC BLOOD PRESSURE: 62 MMHG | HEART RATE: 60 BPM | OXYGEN SATURATION: 99 % | RESPIRATION RATE: 16 BRPM | HEIGHT: 68 IN | TEMPERATURE: 98.3 F | SYSTOLIC BLOOD PRESSURE: 112 MMHG | WEIGHT: 181 LBS

## 2022-09-07 PROCEDURE — 99396 PREV VISIT EST AGE 40-64: CPT | Mod: 25

## 2022-09-07 PROCEDURE — 36415 COLL VENOUS BLD VENIPUNCTURE: CPT

## 2022-09-07 NOTE — PHYSICAL EXAM
[No Acute Distress] : no acute distress [Well Nourished] : well nourished [Well Developed] : well developed [Well-Appearing] : well-appearing [Normal Voice/Communication] : normal voice/communication [Normal Sclera/Conjunctiva] : normal sclera/conjunctiva [PERRL] : pupils equal round and reactive to light [EOMI] : extraocular movements intact [Normal Oropharynx] : the oropharynx was normal [Supple] : supple [No Respiratory Distress] : no respiratory distress  [Clear to Auscultation] : lungs were clear to auscultation bilaterally [Normal Rate] : normal rate  [Normal S1, S2] : normal S1 and S2 [Soft] : abdomen soft [Non Tender] : non-tender [Normal Bowel Sounds] : normal bowel sounds [Speech Grossly Normal] : speech grossly normal [Normal Affect] : the affect was normal [Normal Mood] : the mood was normal

## 2022-09-07 NOTE — HISTORY OF PRESENT ILLNESS
[FreeTextEntry1] : PE [de-identified] : 56 y/o male presents for annual wellness exam.  Patient notes doing well overall.  No particular questions or concerns today.\par \par History of diabetes.  On metformin 500 mg daily.\par \par Hypertension/hyperlipidemia.  On atorvastatin 20 mg daily and amlodipine 10 mg daily.  Patient is followed by cardiology.\par \par Patient also notes recently having sleep apnea test done.  Results pending.

## 2022-09-07 NOTE — PLAN
[FreeTextEntry1] : 57-year-old male for annual physical exam.  Up-to-date with COVID-vaccine and colonoscopy.  Patient to verify with insurance whether shingles vaccine covered in office.  Recent labs reviewed.\par \par Hypertension/hyperlipidemia.  Well-controlled.  Continue amlodipine and atorvastatin.  Patient has follow-up with cardiology tomorrow.\par \par Vitamin D deficiency.  Repeat level drawn today.  \par \par Diabetes.  Most recent A1c noted to be 6.5 in June 2022 which was an improvement from 6.7 in December 2021.  Patient was started on metformin 500 mg daily April 2022.  Repeat A1c today.\par \par All questions answered.  Patient voiced understanding and agreement above plan.  Return to clinic as recommended.\par

## 2022-09-08 ENCOUNTER — NON-APPOINTMENT (OUTPATIENT)
Age: 57
End: 2022-09-08

## 2022-09-08 ENCOUNTER — APPOINTMENT (OUTPATIENT)
Dept: CARDIOLOGY | Facility: CLINIC | Age: 57
End: 2022-09-08

## 2022-09-08 VITALS
OXYGEN SATURATION: 98 % | BODY MASS INDEX: 27.89 KG/M2 | WEIGHT: 184 LBS | DIASTOLIC BLOOD PRESSURE: 76 MMHG | SYSTOLIC BLOOD PRESSURE: 116 MMHG | HEIGHT: 68 IN | HEART RATE: 62 BPM

## 2022-09-08 LAB
25(OH)D3 SERPL-MCNC: 22.9 NG/ML
ESTIMATED AVERAGE GLUCOSE: 140 MG/DL
HBA1C MFR BLD HPLC: 6.5 %

## 2022-09-08 PROCEDURE — 93000 ELECTROCARDIOGRAM COMPLETE: CPT

## 2022-09-08 PROCEDURE — 99214 OFFICE O/P EST MOD 30 MIN: CPT | Mod: 25

## 2022-09-08 RX ORDER — ZOSTER VACCINE RECOMBINANT, ADJUVANTED 50 MCG/0.5
50 KIT INTRAMUSCULAR ONCE
Qty: 1 | Refills: 0 | Status: DISCONTINUED | COMMUNITY
Start: 2022-09-08 | End: 2022-09-08

## 2022-09-08 NOTE — CARDIOLOGY SUMMARY
[de-identified] : 6/2/22 sinus bradycardia  low Qrs voltage ,  T wave inversion inferior , V4 to V6   ( no significant change from ekg april 2022 )  [de-identified] : 5/19/22 9 METS 90% MPHR  normal nuclear perfusion scan  [de-identified] : 5/23/22   EF 58%

## 2022-09-08 NOTE — HISTORY OF PRESENT ILLNESS
[FreeTextEntry1] : 57 year old male with hx of hypertension hyperlipidemia  abnormal ekg came for follow up says he is doing well , he had physical  yesterday \par his  pressure is improved    patient blood work showed improved lipids on current dose of medication\par \par His ekg done today showed  T wave inversion  which are new compared to old EKG done in 2008  for which patient had  work up done recently \par \par his blood work showed increase hemoglobin A1c   recent 6.5  patient has family hx of DM , lipid profile  LDL 98 \par very low vitamin D \par \par Sleep study moderate sleep apnea  \par \par had  recommended tests  for new ekg changes ,   echo showed normal study , normal exercise stress test ,

## 2022-09-08 NOTE — ASSESSMENT
[FreeTextEntry1] : 57 year old male with above hx\par \par \par Diagnosed to moderate obstructive sleep apnea : would recommended follow up sleep cpap titration study , sleep specialist evaluation \par \par Abnormal EKG ( new T wave inversion ) without chest pain , possible hypertensive heart disease , no obvious LVH on echo , normal ventricular function ,normal exercise stress test , \par \par DM type II , : advised the patient to follow diet restriction , life style modification including exercise , will continue  him on metformin 500 mg po daily follow up blood work \par \par HTN : controlled ,  encourage the patient to follow low salt diet , did not take medication and increase norvasc to 10 mg po daily , home bP monitoring \par \par Hyperlipidemia : uncontrolled , advised the patient to follow life style modification , including exercise , diet restriction , continue  atorvastatin to 20 mg po daily target LDL < 80 .\par \par Low vitamin D : will give high dose vitamin D 50 K weekly for 6 weeks then  1k daily , \par \par \par \par

## 2022-09-14 ENCOUNTER — APPOINTMENT (OUTPATIENT)
Dept: INTERNAL MEDICINE | Facility: CLINIC | Age: 57
End: 2022-09-14

## 2022-09-20 ENCOUNTER — RX RENEWAL (OUTPATIENT)
Age: 57
End: 2022-09-20

## 2022-11-02 ENCOUNTER — NON-APPOINTMENT (OUTPATIENT)
Age: 57
End: 2022-11-02

## 2022-11-02 ENCOUNTER — APPOINTMENT (OUTPATIENT)
Dept: PULMONOLOGY | Facility: CLINIC | Age: 57
End: 2022-11-02

## 2022-11-02 VITALS
HEART RATE: 69 BPM | DIASTOLIC BLOOD PRESSURE: 80 MMHG | SYSTOLIC BLOOD PRESSURE: 126 MMHG | WEIGHT: 185 LBS | OXYGEN SATURATION: 97 % | RESPIRATION RATE: 16 BRPM | HEIGHT: 67.5 IN | BODY MASS INDEX: 28.7 KG/M2

## 2022-11-02 DIAGNOSIS — Z78.9 OTHER SPECIFIED HEALTH STATUS: ICD-10-CM

## 2022-11-02 PROCEDURE — 99204 OFFICE O/P NEW MOD 45 MIN: CPT

## 2022-11-02 NOTE — DISCUSSION/SUMMARY
[FreeTextEntry1] : \par #1. Start autoCPAP to treat moderate KWAME with an AHI of 17.7; encouraged at least 70% compliance \par #2. Replace PAP equipment as needed; ordered 11/2/22\par #3. Pt denies any h/o asthma, COPD or significant smoking hx. \par #4. The patient does not appear to require chronic BD therapy at this time\par #5. Diet and exercise for weight loss \par #6. Pt had both Covid vaccines and boosters; s/p covid infection\par #7. F/u one month after starting CPAP therapy with compliance \par #8. Reviewed risks of exposure and symptoms of Covid-19 virus, including how the virus is spread and precautions to avoid fernando virus. \par \par The patient expressed understanding and agreement with the above recommendations/plan and accepts responsibility to be compliant with recommended testing, therapies, and f/u visits.\par All relevant questions and concerns were addressed. \par Discussed above with patient and wife who was also present.

## 2022-11-02 NOTE — PHYSICAL EXAM
[No Acute Distress] : no acute distress [Normal Appearance] : normal appearance [Supple] : supple [Normal Rate/Rhythm] : normal rate/rhythm [Normal S1, S2] : normal s1, s2 [No Murmurs] : no murmurs [No Resp Distress] : no resp distress [No Acc Muscle Use] : no acc muscle use [Normal Rhythm and Effort] : normal rhythm and effort [Clear to Auscultation Bilaterally] : clear to auscultation bilaterally [Benign] : benign [Not Tender] : not tender [No Clubbing] : no clubbing [No Edema] : no edema [No Focal Deficits] : no focal deficits [Oriented x3] : oriented x3 [TextBox_2] : Pt is overweight

## 2022-11-02 NOTE — CONSULT LETTER
[Dear  ___] : Dear  [unfilled], [Consult Letter:] : I had the pleasure of evaluating your patient, [unfilled]. [Please see my note below.] : Please see my note below. [Consult Closing:] : Thank you very much for allowing me to participate in the care of this patient.  If you have any questions, please do not hesitate to contact me. [Sincerely,] : Sincerely, [FreeTextEntry3] : Sudarshan Cardona MD, FCCP, D. ABSM\par Pulmonary and Sleep Medicine\par [facility] at [place]  [DrFracisco  ___] : Dr. GIVENS

## 2022-11-02 NOTE — HISTORY OF PRESENT ILLNESS
[Never] : never [Initial Evaluation] : an initial evaluation of [Excessive Daytime Sleepiness] : excessive daytime sleepiness [Snoring] : snoring [Unrefreshing Sleep] : unrefreshing sleep [Sleepy When Sedentary] : sleepy when sedentary [Currently Experiencing] : The patient is currently experiencing symptoms. [None] : The patient is not currently being treated for this problem [TextBox_4] : Pt with covid infection 3/2020 with mild symptoms and did not require therapy.\par Denies respiratory complaints.\par Follows with cardiology for abnormal EKG but w/u was normal per pt.\par On Metformin for prediabetes.\par  [Witnessed Apnea During Sleep] : no witnessed apnea during sleep [Witnessed Gasping During Sleep] : no witnessed gasping during sleep [ESS] : 10 [TextBox_11] : 7

## 2022-11-02 NOTE — CONSULT LETTER
[Dear  ___] : Dear  [unfilled], [Consult Letter:] : I had the pleasure of evaluating your patient, [unfilled]. [Please see my note below.] : Please see my note below. [Consult Closing:] : Thank you very much for allowing me to participate in the care of this patient.  If you have any questions, please do not hesitate to contact me. [Sincerely,] : Sincerely, [FreeTextEntry3] : Sudarshan Cardona MD, FCCP, D. ABSM\par Pulmonary and Sleep Medicine\par [facility] at [place]  [DrFracisco  ___] : Dr. GIVENS

## 2022-11-02 NOTE — REASON FOR VISIT
[Initial] : an initial visit [Sleep Apnea] : sleep apnea [Spouse] : spouse [TextBox_44] : weight issues, prior Covid infection

## 2022-12-19 ENCOUNTER — RX RENEWAL (OUTPATIENT)
Age: 57
End: 2022-12-19

## 2023-01-12 ENCOUNTER — APPOINTMENT (OUTPATIENT)
Dept: CARDIOLOGY | Facility: CLINIC | Age: 58
End: 2023-01-12
Payer: COMMERCIAL

## 2023-01-12 ENCOUNTER — NON-APPOINTMENT (OUTPATIENT)
Age: 58
End: 2023-01-12

## 2023-01-12 VITALS
HEART RATE: 77 BPM | BODY MASS INDEX: 28.85 KG/M2 | OXYGEN SATURATION: 98 % | WEIGHT: 186 LBS | DIASTOLIC BLOOD PRESSURE: 90 MMHG | SYSTOLIC BLOOD PRESSURE: 134 MMHG | HEIGHT: 67.5 IN

## 2023-01-12 PROCEDURE — 93000 ELECTROCARDIOGRAM COMPLETE: CPT

## 2023-01-12 PROCEDURE — 99214 OFFICE O/P EST MOD 30 MIN: CPT | Mod: 25

## 2023-01-12 NOTE — CARDIOLOGY SUMMARY
[de-identified] : 6/2/22 sinus bradycardia  low Qrs voltage ,  T wave inversion inferior , V4 to V6   ( no significant change from ekg april 2022 )  [de-identified] : 5/19/22 9 METS 90% MPHR  normal nuclear perfusion scan  [de-identified] : 5/23/22   EF 58%

## 2023-01-12 NOTE — HISTORY OF PRESENT ILLNESS
[FreeTextEntry1] : 57 year old male with hx of hypertension hyperlipidemia  abnormal ekg came for follow up says he is feeling well .\par \par Patient denies any chest pain or shortness of breath or palpitation \par \par \par his  pressure is improved  minimally elevated   patient blood work showed improved lipids on current dose of medication\par \par His ekg  showed  T wave inversion  similar to previous visit ekg   for which patient had  work up done  ,  echo showed normal study , normal exercise stress test , \par \par his blood work showed increase hemoglobin A1c   recent 6.5  patient has family hx of DM , lipid profile  LDL 98 \par very low vitamin D \par \par Sleep study moderate sleep apnea   have evaluated by sleep medicine  , awaiting for delivery cpap machine

## 2023-01-12 NOTE — ASSESSMENT
[FreeTextEntry1] : 57 year old male with above hx\par \par \par Diagnosed to moderate obstructive sleep apnea : s/p sleep specialist evaluation , encourage to use cpap machine regularly \par \par Abnormal EKG ( stable T wave inversion ) without chest pain , possible hypertensive heart disease , no obvious LVH on echo , normal ventricular function ,normal exercise stress test , \par \par DM type II , : advised the patient to follow diet restriction , life style modification including exercise , will continue  him on metformin 500 mg po bid follow up blood work \par \par HTN : controlled ,  encourage the patient to follow low salt diet ,  continue norvasc  10 mg po daily , home bP monitoring \par \par Hyperlipidemia : uncontrolled , advised the patient to follow life style modification , including exercise , diet restriction , continue  atorvastatin to 20 mg po daily target LDL < 80 .\par \par Low vitamin D : will give high dose vitamin D 50 K weekly for 6 weeks then  1k daily , \par \par \par \par

## 2023-02-01 ENCOUNTER — APPOINTMENT (OUTPATIENT)
Dept: FAMILY MEDICINE | Facility: CLINIC | Age: 58
End: 2023-02-01
Payer: COMMERCIAL

## 2023-02-01 PROCEDURE — 99213 OFFICE O/P EST LOW 20 MIN: CPT

## 2023-02-01 NOTE — PLAN
[FreeTextEntry1] : 57-year-old male for follow-up.\par \par Hypertension/hyperlipidemia. Controlled. Continue amlodipine and atorvastatin. Patient follows with cardiology.\par \par Vitamin D deficiency. Repeat level to be drawn.\par \par Diabetes. Most recent A1c noted to be 6.5 in September 2022 which was unchanged from prior level June 2022.   Patient was started on metformin 500 mg daily April 2022.  This was increased to 500 mg of metformin twice daily in September 2022.  Repeat A1c to to be obtained.\par \par Forms for FMLA to be done.\par \par All questions answered. Patient voiced understanding and in agreement to above plan. Return to clinic as recommended.

## 2023-02-01 NOTE — HISTORY OF PRESENT ILLNESS
[FreeTextEntry1] : Patient presents to discuss FMLA.  [de-identified] : 57-year-old male for follow-up.\par \par History of diabetes. On metformin 500 mg  BID.  Was taking 500 mg daily up to most recent A1c drawn in September 2022. Level of 6.5 ordered which was unchanged from previous level few months prior.\par \par Hypertension/hyperlipidemia. On atorvastatin 20 mg daily and amlodipine 10 mg daily. Patient is followed by cardiology.\par \par Additionally, patient requesting to have FMLA forms filled out to care for his mother.

## 2023-02-08 ENCOUNTER — APPOINTMENT (OUTPATIENT)
Dept: FAMILY MEDICINE | Facility: CLINIC | Age: 58
End: 2023-02-08
Payer: COMMERCIAL

## 2023-02-08 PROCEDURE — 36415 COLL VENOUS BLD VENIPUNCTURE: CPT

## 2023-02-09 LAB
ESTIMATED AVERAGE GLUCOSE: 137 MG/DL
HBA1C MFR BLD HPLC: 6.4 %

## 2023-03-15 ENCOUNTER — APPOINTMENT (OUTPATIENT)
Dept: PULMONOLOGY | Facility: CLINIC | Age: 58
End: 2023-03-15
Payer: COMMERCIAL

## 2023-03-15 VITALS
OXYGEN SATURATION: 96 % | HEIGHT: 67.5 IN | DIASTOLIC BLOOD PRESSURE: 62 MMHG | SYSTOLIC BLOOD PRESSURE: 112 MMHG | WEIGHT: 175 LBS | HEART RATE: 70 BPM | BODY MASS INDEX: 27.15 KG/M2 | RESPIRATION RATE: 16 BRPM

## 2023-03-15 PROCEDURE — 99214 OFFICE O/P EST MOD 30 MIN: CPT

## 2023-03-15 NOTE — PHYSICAL EXAM
[No Acute Distress] : no acute distress [Normal Appearance] : normal appearance [Supple] : supple [Normal Rate/Rhythm] : normal rate/rhythm [Normal S1, S2] : normal s1, s2 [No Murmurs] : no murmurs [No Resp Distress] : no resp distress [No Acc Muscle Use] : no acc muscle use [Normal Rhythm and Effort] : normal rhythm and effort [Clear to Auscultation Bilaterally] : clear to auscultation bilaterally [Benign] : benign [Not Tender] : not tender [No Clubbing] : no clubbing [No Edema] : no edema [Oriented x3] : oriented x3 [No Focal Deficits] : no focal deficits [TextBox_2] : Pt is overweight

## 2023-03-15 NOTE — HISTORY OF PRESENT ILLNESS
[Excessive Daytime Sleepiness] : excessive daytime sleepiness [Snoring] : snoring [Unrefreshing Sleep] : unrefreshing sleep [Sleepy When Sedentary] : sleepy when sedentary [Currently Experiencing] : The patient is currently experiencing symptoms. [None] : The patient is not currently being treated for this problem [TextBox_4] : Pt with covid infection 3/2020 with mild symptoms and did not require therapy.\par Denies respiratory complaints.\par Follows with cardiology for abnormal EKG but w/u was normal per pt.\par On Metformin for prediabetes.\par  [ESS] : 10 [TextBox_11] : 7 [Follow-Up - Routine Clinic] : a routine clinic follow-up of [Witnessed Apnea During Sleep] : no witnessed apnea during sleep [Witnessed Gasping During Sleep] : no witnessed gasping during sleep

## 2023-03-15 NOTE — REASON FOR VISIT
[Sleep Apnea] : sleep apnea [Spouse] : spouse [Follow-Up] : a follow-up visit [TextBox_44] : weight issues, prior Covid infection

## 2023-03-15 NOTE — DISCUSSION/SUMMARY
[FreeTextEntry1] : \par #1. Continue autoCPAP to treat moderate KWAME with an AHI of 17.7; encouraged at least 70% compliance \par #2. Replace PAP equipment as needed; ordered 11/2/22\par #3. Pt denies any h/o asthma, COPD or significant smoking hx. \par #4. The patient does not appear to require chronic BD therapy at this time\par #5. Diet and exercise for weight loss \par #6. Pt had both Covid vaccines and boosters; s/p covid infection\par #7. F/u in 4-6 weeks with compliance \par #8. Reviewed risks of exposure and symptoms of Covid-19 virus, including how the virus is spread and precautions to avoid fernando virus. \par \par The patient expressed understanding and agreement with the above recommendations/plan and accepts responsibility to be compliant with recommended testing, therapies, and f/u visits.\par All relevant questions and concerns were addressed. \par Discussed above with patient and wife who was also present.

## 2023-03-15 NOTE — CONSULT LETTER
[Dear  ___] : Dear  [unfilled], [Consult Letter:] : I had the pleasure of evaluating your patient, [unfilled]. [Please see my note below.] : Please see my note below. [Consult Closing:] : Thank you very much for allowing me to participate in the care of this patient.  If you have any questions, please do not hesitate to contact me. [Sincerely,] : Sincerely, [DrFracisco  ___] : Dr. GIVENS [FreeTextEntry3] : Sudarshan Cardona MD, FCCP, D. ABSM\par Pulmonary and Sleep Medicine\par [facility] at [place]

## 2023-04-21 ENCOUNTER — APPOINTMENT (OUTPATIENT)
Dept: FAMILY MEDICINE | Facility: CLINIC | Age: 58
End: 2023-04-21
Payer: COMMERCIAL

## 2023-04-21 VITALS
TEMPERATURE: 97.4 F | HEIGHT: 68 IN | OXYGEN SATURATION: 99 % | SYSTOLIC BLOOD PRESSURE: 142 MMHG | BODY MASS INDEX: 27.13 KG/M2 | DIASTOLIC BLOOD PRESSURE: 88 MMHG | WEIGHT: 179 LBS | HEART RATE: 91 BPM

## 2023-04-21 DIAGNOSIS — H10.9 UNSPECIFIED CONJUNCTIVITIS: ICD-10-CM

## 2023-04-21 PROCEDURE — 99213 OFFICE O/P EST LOW 20 MIN: CPT

## 2023-04-21 NOTE — PHYSICAL EXAM
[No Acute Distress] : no acute distress [Well Nourished] : well nourished [Well Developed] : well developed [Well-Appearing] : well-appearing [Normal Voice/Communication] : normal voice/communication [PERRL] : pupils equal round and reactive to light [EOMI] : extraocular movements intact [Normal Oropharynx] : the oropharynx was normal [Supple] : supple [No Respiratory Distress] : no respiratory distress  [Clear to Auscultation] : lungs were clear to auscultation bilaterally [Normal Rate] : normal rate  [Normal S1, S2] : normal S1 and S2 [Soft] : abdomen soft [Non Tender] : non-tender [Normal Bowel Sounds] : normal bowel sounds [Speech Grossly Normal] : speech grossly normal [Normal Affect] : the affect was normal [Normal Mood] : the mood was normal [de-identified] : Erythematous conjunctiva

## 2023-04-21 NOTE — HISTORY OF PRESENT ILLNESS
[FreeTextEntry8] : 58-year-old male for conjunctivitis.  Was sent home from work today.  Left eye redness and discharge noted.  No vision disturbances.

## 2023-04-21 NOTE — REVIEW OF SYSTEMS
[Fever] : no fever [Fatigue] : no fatigue [Discharge] : discharge [Redness] : redness [Earache] : no earache [Chest Pain] : no chest pain [Palpitations] : no palpitations [Shortness Of Breath] : no shortness of breath [Cough] : no cough [Abdominal Pain] : no abdominal pain [Dysuria] : no dysuria [Headache] : no headache

## 2023-04-21 NOTE — PLAN
[FreeTextEntry1] : 58-year-old male for conjunctivitis.  Erythromycin as prescribed.  Signs and symptoms warranting further eval advised.  All questions answered.  Patient voiced understanding and in agreement to plan.  Return to clinic as recommended.

## 2023-04-28 ENCOUNTER — APPOINTMENT (OUTPATIENT)
Dept: PULMONOLOGY | Facility: CLINIC | Age: 58
End: 2023-04-28
Payer: COMMERCIAL

## 2023-04-28 VITALS
DIASTOLIC BLOOD PRESSURE: 84 MMHG | HEART RATE: 89 BPM | BODY MASS INDEX: 27.13 KG/M2 | WEIGHT: 179 LBS | RESPIRATION RATE: 16 BRPM | OXYGEN SATURATION: 98 % | HEIGHT: 68 IN | SYSTOLIC BLOOD PRESSURE: 126 MMHG

## 2023-04-28 PROCEDURE — 99214 OFFICE O/P EST MOD 30 MIN: CPT

## 2023-04-28 NOTE — DISCUSSION/SUMMARY
[FreeTextEntry1] : \par #1. Continue autoCPAP to treat moderate KWAME with an AHI of 17.7; encouraged at least 70% compliance \par #2. Replace PAP equipment as needed; ordered 4/28/23\par #3. Pt denies any h/o asthma, COPD or significant smoking hx. \par #4. The patient does not appear to require chronic BD therapy at this time\par #5. Diet and exercise for weight loss \par #6. Pt had both Covid vaccines and boosters; s/p covid infection\par #7. F/u in 1 month weeks with compliance \par #8. Reviewed risks of exposure and symptoms of Covid-19 virus, including how the virus is spread and precautions to avoid fernando virus. \par \par The patient expressed understanding and agreement with the above recommendations/plan and accepts responsibility to be compliant with recommended testing, therapies, and f/u visits.\par All relevant questions and concerns were addressed. \par Discussed above with patient and wife who was also present.

## 2023-04-28 NOTE — HISTORY OF PRESENT ILLNESS
[Follow-Up - Routine Clinic] : a routine clinic follow-up of [Excessive Daytime Sleepiness] : excessive daytime sleepiness [Snoring] : snoring [Unrefreshing Sleep] : unrefreshing sleep [Sleepy When Sedentary] : sleepy when sedentary [Currently Experiencing] : The patient is currently experiencing symptoms. [None] : The patient is not currently being treated for this problem [TextBox_4] : Never smoker.\par Pt with covid infection 3/2020 with mild symptoms and did not require therapy. Pt s/p Covid infection again 4/2022 with rhinorrhea, nasal symptoms and was treated with Molnupiravir.\par Denies respiratory complaints.\par Follows with cardiology for abnormal EKG but w/u was normal per pt.\par On Metformin for prediabetes.\par \par  [ESS] : 10 [TextBox_11] : 7 [Witnessed Apnea During Sleep] : no witnessed apnea during sleep [Witnessed Gasping During Sleep] : no witnessed gasping during sleep

## 2023-04-28 NOTE — CONSULT LETTER
[Dear  ___] : Dear  [unfilled], [Consult Letter:] : I had the pleasure of evaluating your patient, [unfilled]. [Please see my note below.] : Please see my note below. [Consult Closing:] : Thank you very much for allowing me to participate in the care of this patient.  If you have any questions, please do not hesitate to contact me. [Sincerely,] : Sincerely, [DrFracisco  ___] : Dr. GIVENS [FreeTextEntry3] : Sudarshan Cardona MD, FCCP, D. ABSM\par Pulmonary and Sleep Medicine\par [facility] at [place]

## 2023-04-28 NOTE — REASON FOR VISIT
[Follow-Up] : a follow-up visit [Sleep Apnea] : sleep apnea [Spouse] : spouse [TextBox_44] : weight issues, prior Covid infection

## 2023-05-26 ENCOUNTER — APPOINTMENT (OUTPATIENT)
Dept: CARDIOLOGY | Facility: CLINIC | Age: 58
End: 2023-05-26
Payer: COMMERCIAL

## 2023-05-26 ENCOUNTER — NON-APPOINTMENT (OUTPATIENT)
Age: 58
End: 2023-05-26

## 2023-05-26 ENCOUNTER — APPOINTMENT (OUTPATIENT)
Dept: CARDIOLOGY | Facility: CLINIC | Age: 58
End: 2023-05-26

## 2023-05-26 VITALS
HEART RATE: 73 BPM | HEIGHT: 68 IN | WEIGHT: 183 LBS | OXYGEN SATURATION: 96 % | BODY MASS INDEX: 27.74 KG/M2 | DIASTOLIC BLOOD PRESSURE: 88 MMHG | SYSTOLIC BLOOD PRESSURE: 120 MMHG

## 2023-05-26 VITALS — DIASTOLIC BLOOD PRESSURE: 90 MMHG | SYSTOLIC BLOOD PRESSURE: 130 MMHG

## 2023-05-26 PROCEDURE — 93000 ELECTROCARDIOGRAM COMPLETE: CPT

## 2023-05-26 PROCEDURE — 99214 OFFICE O/P EST MOD 30 MIN: CPT | Mod: 25

## 2023-05-26 RX ORDER — HYDROCORTISONE ACETATE 25 MG/1
25 SUPPOSITORY RECTAL
Qty: 14 | Refills: 0 | Status: DISCONTINUED | COMMUNITY
Start: 2022-02-28 | End: 2023-05-26

## 2023-05-30 NOTE — ASSESSMENT
[FreeTextEntry1] : 57 year old male with above hx\par \par Diagnosed to moderate obstructive sleep apnea : s/p sleep specialist evaluation , encourage to use cpap machine regularly \par \par Abnormal EKG ( stable T wave inversion ) without chest pain , possible hypertensive heart disease , no obvious LVH on echo , normal ventricular function ,normal exercise stress test , \par \par DM type II , : advised the patient to follow diet restriction , life style modification including exercise , will continue  him on metformin 500 mg po bid monitor labs \par \par HTN : mildly elevated.  Add HCTZ 12.5mg daily.  encourage the patient to follow low salt diet ,  continue norvasc  10 mg po daily , home bP monitoring \par \par Hyperlipidemia : No recent lipid panel.  Labs ordered.  advised the patient to follow life style modification , including exercise , diet restriction , continue  atorvastatin to 20 mg po daily target LDL < 80 .\par \par Low vitamin D : advised to start high dose vitamin D 50 K weekly for 6 weeks previously recommended at last visit, then  1k daily , \par \par \par Follow up 4 months\par \par

## 2023-05-30 NOTE — HISTORY OF PRESENT ILLNESS
[FreeTextEntry1] : 58 year old male with hx of hypertension hyperlipidemia  abnormal ekg came for follow up says he is feeling well .\par \par Patient denies any chest pain or shortness of breath or palpitation \par \par His  pressure is borderline.   Patient blood work showed improved lipids on current dose of medication\par \par His ekg  showed  T wave inversion  similar to previous visit ekg   for which patient had  work up done  ,  echo showed normal study , normal exercise stress test , \par \par his blood work showed hemoglobin A1c  6.4  patient has family hx of DM , lipid profile  LDL 98 \par very low vitamin D.  Not taking rx vitamin D as prescribed\par \par Sleep study moderate sleep apnea   have evaluated by sleep medicine  , using cpap machine but removes in the middle of the night due to itching. Then he replaces it.

## 2023-05-30 NOTE — CARDIOLOGY SUMMARY
[de-identified] : 6/2/22 sinus bradycardia  low Qrs voltage ,  T wave inversion inferior , V4 to V6   ( no significant change from ekg april 2022 )  [de-identified] : 5/19/22 9 METS 90% MPHR  normal nuclear perfusion scan  [de-identified] : 5/23/22   EF 58%

## 2023-06-14 ENCOUNTER — APPOINTMENT (OUTPATIENT)
Dept: PULMONOLOGY | Facility: CLINIC | Age: 58
End: 2023-06-14
Payer: COMMERCIAL

## 2023-06-14 VITALS
RESPIRATION RATE: 16 BRPM | BODY MASS INDEX: 27.74 KG/M2 | SYSTOLIC BLOOD PRESSURE: 138 MMHG | WEIGHT: 183 LBS | DIASTOLIC BLOOD PRESSURE: 88 MMHG | HEART RATE: 71 BPM | OXYGEN SATURATION: 97 % | HEIGHT: 68 IN

## 2023-06-14 DIAGNOSIS — J32.9 CHRONIC SINUSITIS, UNSPECIFIED: ICD-10-CM

## 2023-06-14 DIAGNOSIS — Z86.16 PERSONAL HISTORY OF COVID-19: ICD-10-CM

## 2023-06-14 DIAGNOSIS — J30.9 ALLERGIC RHINITIS, UNSPECIFIED: ICD-10-CM

## 2023-06-14 DIAGNOSIS — E66.3 OVERWEIGHT: ICD-10-CM

## 2023-06-14 PROCEDURE — 99214 OFFICE O/P EST MOD 30 MIN: CPT

## 2023-06-14 NOTE — CONSULT LETTER
[Dear  ___] : Dear  [unfilled], [Consult Letter:] : I had the pleasure of evaluating your patient, [unfilled]. [Please see my note below.] : Please see my note below. [Sincerely,] : Sincerely, [Consult Closing:] : Thank you very much for allowing me to participate in the care of this patient.  If you have any questions, please do not hesitate to contact me. [DrrFacisco  ___] : Dr. GIVENS [FreeTextEntry3] : Sudarshan Cardona MD, FCCP, D. ABSM\par Pulmonary and Sleep Medicine\par [facility] at [place]

## 2023-06-14 NOTE — DISCUSSION/SUMMARY
[FreeTextEntry1] : \par #1. Continue autoCPAP to treat moderate KWAME with an AHI of 17.7; encouraged at least 70% compliance \par #2. Replace PAP equipment as needed; ordered 4/28/23\par #3. Pt denies any h/o asthma, COPD or significant smoking hx. \par #4. The patient does not appear to require chronic BD therapy at this time\par #5. Diet and exercise for weight loss \par #6. Pt had both Covid vaccines and boosters; s/p covid infection\par #7. F/u in 6 weeks with compliance \par #8. Reviewed risks of exposure and symptoms of Covid-19 virus, including how the virus is spread and precautions to avoid fernando virus. \par \par The patient expressed understanding and agreement with the above recommendations/plan and accepts responsibility to be compliant with recommended testing, therapies, and f/u visits.\par All relevant questions and concerns were addressed. \par Discussed above with patient and wife who was also present.

## 2023-07-25 ENCOUNTER — APPOINTMENT (OUTPATIENT)
Dept: COLORECTAL SURGERY | Facility: CLINIC | Age: 58
End: 2023-07-25
Payer: COMMERCIAL

## 2023-07-25 VITALS
BODY MASS INDEX: 27.58 KG/M2 | HEIGHT: 68 IN | TEMPERATURE: 97.5 F | RESPIRATION RATE: 15 BRPM | HEART RATE: 69 BPM | SYSTOLIC BLOOD PRESSURE: 140 MMHG | OXYGEN SATURATION: 98 % | DIASTOLIC BLOOD PRESSURE: 87 MMHG | WEIGHT: 182 LBS

## 2023-07-25 DIAGNOSIS — K64.8 OTHER HEMORRHOIDS: ICD-10-CM

## 2023-07-25 PROCEDURE — 99213 OFFICE O/P EST LOW 20 MIN: CPT | Mod: 25

## 2023-07-25 PROCEDURE — 46221 LIGATION OF HEMORRHOID(S): CPT

## 2023-07-25 NOTE — ASSESSMENT
[FreeTextEntry1] : 58M Bleeding internal hemorrhoids. Findings on anoscopy: prominent internal hemorrhoids. Banding performed, LL x 1, RP  x 1, RA x 1\par \par Plan of care for Hemorrhoids\par Add daily fiber supplementation to diet, Metamucil, Benefiber, or fiber supplement of preference\par Daily over the counter stool softener (eg.Colace) to prevent straining\par Increased fluid intake, preferably water\par Refrain from straining or lingering (eg. reading) on the toilet\par Warm sitz bath after bowel movements, no more than 3/day, do not exceed 10 minutes per sitz bath\par Post band ligation instruction given - For worsening pain, fevers, or trouble urinating, patient advised to call office to arrange for urgent re-evaluation.\par Patient will follow up in 4 weeks\par

## 2023-07-25 NOTE — HISTORY OF PRESENT ILLNESS
[FreeTextEntry1] : 58 year old male last seen 1 year ago for hemorrhoids presents for follow up evaluation after noticing BRBPR over the last few weeks. He reported the symptoms were mostly with bowel movements, but he experienced one episode while passing gas. Patient denies any pain, and at this time he has had no episodes over the past 3 days. He denies constipation/straining/hard stools. No other symptoms reported.\par \par \par \par

## 2023-07-25 NOTE — PHYSICAL EXAM
[Normal rectal exam] : exam was normal [Nonprolapsing] : a nonprolapsing (grade I) [Normal] : was normal [None] : there was no rectal abscess [Alert] : alert [Oriented to Person] : oriented to person [Oriented to Place] : oriented to place [Oriented to Time] : oriented to time [Calm] : calm [Abdomen Tenderness] : ~T No ~M abdominal tenderness [Excoriation] : no perianal excoriation [Fistula] : no fistulas [Tender, Swollen] : nontender, non-swollen [Gross Blood] : no gross blood [de-identified] : ALON: Nontender, no gross blood, prominent internal hemorrhoids [de-identified] : Normal external examination [de-identified] : NAD [de-identified] : Normal rate [de-identified] : Non-labored respiration

## 2023-07-25 NOTE — PROCEDURE
[FreeTextEntry1] : Anoscopy and rubber band hemorrhoid ligation\par \par I discussed the reason for the procedure, and the risks and benefits with the patient. Risks including bleeding and discomfort were discussed. The patient understood or discussion and would like to proceed with the procedure.\par \par After completing a digital rectal exam a well lubricated anoscope was gently inserted into the anus. Complete inspection was performed. No tenderness on insertion of the anoscope, and the procedure was tolerated well. No fissures, fistula openings, or masses were identified.\par \par Findings: prominent internal hemorrhoids. Banding performed, LL x 1, RP  x 1, RA x 1

## 2023-08-07 ENCOUNTER — APPOINTMENT (OUTPATIENT)
Dept: FAMILY MEDICINE | Facility: CLINIC | Age: 58
End: 2023-08-07
Payer: COMMERCIAL

## 2023-08-07 ENCOUNTER — RESULT REVIEW (OUTPATIENT)
Age: 58
End: 2023-08-07

## 2023-08-07 VITALS
HEART RATE: 80 BPM | SYSTOLIC BLOOD PRESSURE: 140 MMHG | HEIGHT: 68 IN | TEMPERATURE: 98 F | OXYGEN SATURATION: 98 % | BODY MASS INDEX: 27.58 KG/M2 | DIASTOLIC BLOOD PRESSURE: 90 MMHG | WEIGHT: 182 LBS

## 2023-08-07 PROCEDURE — 99213 OFFICE O/P EST LOW 20 MIN: CPT

## 2023-08-07 RX ORDER — MELOXICAM 7.5 MG/1
7.5 TABLET ORAL TWICE DAILY
Qty: 60 | Refills: 0 | Status: ACTIVE | COMMUNITY
Start: 2023-08-07 | End: 1900-01-01

## 2023-08-07 NOTE — HISTORY OF PRESENT ILLNESS
[FreeTextEntry1] : Back pain [de-identified] : 58-year-old male for acute back pain since this AM.  Patient notes radiation down legs.  Sensation intact.  No incontinence noted.  No injuries noted.

## 2023-08-07 NOTE — PHYSICAL EXAM
[No Acute Distress] : no acute distress [Well Nourished] : well nourished [Well Developed] : well developed [Well-Appearing] : well-appearing [Normal Voice/Communication] : normal voice/communication [Normal Oropharynx] : the oropharynx was normal [Supple] : supple [No Respiratory Distress] : no respiratory distress  [Clear to Auscultation] : lungs were clear to auscultation bilaterally [Normal Rate] : normal rate  [Normal S1, S2] : normal S1 and S2 [Soft] : abdomen soft [Non Tender] : non-tender [Normal Bowel Sounds] : normal bowel sounds [Speech Grossly Normal] : speech grossly normal [Normal Affect] : the affect was normal [Normal Mood] : the mood was normal

## 2023-08-07 NOTE — PLAN
[FreeTextEntry1] : 58-year-old male for back pain.  Possible sciatica.  X-ray ordered.  Meloxicam as needed.  Signs and symptoms warranting further eval advised.  All questions answered.  Patient voiced understanding and in agreement to plan.  Return to clinic as recommended.

## 2023-08-07 NOTE — REVIEW OF SYSTEMS
[Discharge] : discharge [Redness] : redness [Back Pain] : back pain [Fever] : no fever [Fatigue] : no fatigue [Earache] : no earache [Chest Pain] : no chest pain [Palpitations] : no palpitations [Shortness Of Breath] : no shortness of breath [Cough] : no cough [Abdominal Pain] : no abdominal pain [Dysuria] : no dysuria [Headache] : no headache

## 2023-08-08 ENCOUNTER — APPOINTMENT (OUTPATIENT)
Dept: RADIOLOGY | Facility: CLINIC | Age: 58
End: 2023-08-08
Payer: COMMERCIAL

## 2023-08-08 ENCOUNTER — OUTPATIENT (OUTPATIENT)
Dept: OUTPATIENT SERVICES | Facility: HOSPITAL | Age: 58
LOS: 1 days | End: 2023-08-08
Payer: COMMERCIAL

## 2023-08-08 DIAGNOSIS — M54.9 DORSALGIA, UNSPECIFIED: ICD-10-CM

## 2023-08-08 PROCEDURE — 72100 X-RAY EXAM L-S SPINE 2/3 VWS: CPT

## 2023-08-08 PROCEDURE — 72100 X-RAY EXAM L-S SPINE 2/3 VWS: CPT | Mod: 26

## 2023-08-17 ENCOUNTER — APPOINTMENT (OUTPATIENT)
Dept: FAMILY MEDICINE | Facility: CLINIC | Age: 58
End: 2023-08-17
Payer: COMMERCIAL

## 2023-08-17 VITALS
HEART RATE: 67 BPM | OXYGEN SATURATION: 99 % | DIASTOLIC BLOOD PRESSURE: 80 MMHG | BODY MASS INDEX: 27.58 KG/M2 | TEMPERATURE: 98.6 F | HEIGHT: 68 IN | SYSTOLIC BLOOD PRESSURE: 120 MMHG | WEIGHT: 182 LBS

## 2023-08-17 PROCEDURE — 99213 OFFICE O/P EST LOW 20 MIN: CPT

## 2023-08-17 NOTE — PLAN
[FreeTextEntry1] : 58-year-old male for follow-up back pain.  Improving.  Continue meloxicam as needed.  Considering job function, out of work note until 8/25/2023 when patient will be re-evaluated for return status.  Signs and symptoms warranting further eval advised.  All questions answered.  Patient voiced understanding and in agreement to plan.  Return to clinic as recommended.

## 2023-08-17 NOTE — HISTORY OF PRESENT ILLNESS
[FreeTextEntry1] : f/u [de-identified] : 58-year-old male for follow-up back pain.  Patient reports pain is somewhat improved with meloxicam as needed.  Some residual discomfort noted.

## 2023-08-23 ENCOUNTER — APPOINTMENT (OUTPATIENT)
Dept: PULMONOLOGY | Facility: CLINIC | Age: 58
End: 2023-08-23

## 2023-08-25 ENCOUNTER — APPOINTMENT (OUTPATIENT)
Dept: FAMILY MEDICINE | Facility: CLINIC | Age: 58
End: 2023-08-25
Payer: COMMERCIAL

## 2023-08-25 VITALS
HEART RATE: 74 BPM | WEIGHT: 182 LBS | TEMPERATURE: 98.4 F | SYSTOLIC BLOOD PRESSURE: 174 MMHG | BODY MASS INDEX: 27.58 KG/M2 | HEIGHT: 68 IN | OXYGEN SATURATION: 99 % | DIASTOLIC BLOOD PRESSURE: 102 MMHG

## 2023-08-25 PROCEDURE — 99213 OFFICE O/P EST LOW 20 MIN: CPT

## 2023-08-25 NOTE — PLAN
[FreeTextEntry1] : 58-year-old male for follow-up back pain.  Improved.  Can return to work.  Given nature of job function, however, light duty advised.  PT and spine specialist referral discussed.  Continue supportive therapy as needed.  Signs and symptoms warranting further eval advised.  All questions answered.  Patient voiced understanding and in agreement to plan.  Return to clinic as recommended.

## 2023-08-25 NOTE — HISTORY OF PRESENT ILLNESS
[FreeTextEntry1] : Follow-up back pain [de-identified] : 58-year-old male for follow-up back pain. Patient reports pain is improved.  Would like to go back to work with light duty.  BP noted to be elevated today.  Patient reports that he did not take his blood pressure medication for the past 2 days.  Will resume today.  Asymptomatic.

## 2023-08-25 NOTE — REVIEW OF SYSTEMS
[Fever] : no fever [Fatigue] : no fatigue [Discharge] : discharge [Redness] : redness [Earache] : no earache [Chest Pain] : no chest pain [Palpitations] : no palpitations [Shortness Of Breath] : no shortness of breath [Cough] : no cough [Abdominal Pain] : no abdominal pain [Dysuria] : no dysuria [Back Pain] : back pain [Headache] : no headache

## 2023-09-06 ENCOUNTER — APPOINTMENT (OUTPATIENT)
Dept: FAMILY MEDICINE | Facility: CLINIC | Age: 58
End: 2023-09-06
Payer: COMMERCIAL

## 2023-09-06 VITALS
BODY MASS INDEX: 27.74 KG/M2 | DIASTOLIC BLOOD PRESSURE: 88 MMHG | HEART RATE: 87 BPM | HEIGHT: 68 IN | TEMPERATURE: 98 F | OXYGEN SATURATION: 98 % | WEIGHT: 183 LBS | SYSTOLIC BLOOD PRESSURE: 140 MMHG

## 2023-09-06 PROCEDURE — 99213 OFFICE O/P EST LOW 20 MIN: CPT

## 2023-09-06 NOTE — HISTORY OF PRESENT ILLNESS
[FreeTextEntry1] : f/u  [de-identified] : 58-year-old male for follow-up.  Back pain.  Pain is improved.  Patient unable to go back to work with light duty restrictions just yet.  Thus, FMLA forms were requested to be completed.  Hypertension.  Patient reports compliance with therapy.  BP is improved today.  Feeling well.

## 2023-09-06 NOTE — PLAN
[FreeTextEntry1] : 58-year-old male for follow-up.  Back pain. Improved. Given nature of job function, however, light duty advised.  Patient employer unable to accommodate light duty.  Thus, FMLA forms completed.  PT and spine specialist referral were previously discussed. Continue supportive therapy as needed. Signs and symptoms warranting further eval advised.   Hypertension.  Patient is compliant with therapy now.  Continue current medication regimen and follow-up as advised.    All questions answered. Patient voiced understanding and in agreement to plan. Return to clinic as recommended.

## 2023-10-06 ENCOUNTER — APPOINTMENT (OUTPATIENT)
Dept: CARDIOLOGY | Facility: CLINIC | Age: 58
End: 2023-10-06
Payer: COMMERCIAL

## 2023-10-06 ENCOUNTER — NON-APPOINTMENT (OUTPATIENT)
Age: 58
End: 2023-10-06

## 2023-10-06 VITALS
DIASTOLIC BLOOD PRESSURE: 94 MMHG | HEIGHT: 68 IN | WEIGHT: 183 LBS | BODY MASS INDEX: 27.74 KG/M2 | HEART RATE: 70 BPM | OXYGEN SATURATION: 96 % | SYSTOLIC BLOOD PRESSURE: 154 MMHG

## 2023-10-06 PROCEDURE — 99214 OFFICE O/P EST MOD 30 MIN: CPT | Mod: 25

## 2023-10-06 PROCEDURE — 93000 ELECTROCARDIOGRAM COMPLETE: CPT

## 2023-10-16 ENCOUNTER — APPOINTMENT (OUTPATIENT)
Dept: FAMILY MEDICINE | Facility: CLINIC | Age: 58
End: 2023-10-16
Payer: COMMERCIAL

## 2023-10-16 VITALS
OXYGEN SATURATION: 99 % | HEART RATE: 87 BPM | WEIGHT: 184 LBS | TEMPERATURE: 98 F | SYSTOLIC BLOOD PRESSURE: 130 MMHG | DIASTOLIC BLOOD PRESSURE: 86 MMHG | BODY MASS INDEX: 27.89 KG/M2 | HEIGHT: 68 IN

## 2023-10-16 DIAGNOSIS — Z00.00 ENCOUNTER FOR GENERAL ADULT MEDICAL EXAMINATION W/OUT ABNORMAL FINDINGS: ICD-10-CM

## 2023-10-16 PROCEDURE — 99396 PREV VISIT EST AGE 40-64: CPT | Mod: 25

## 2023-10-16 PROCEDURE — 36415 COLL VENOUS BLD VENIPUNCTURE: CPT

## 2023-10-17 LAB
25(OH)D3 SERPL-MCNC: 24.2 NG/ML
ALBUMIN SERPL ELPH-MCNC: 4.8 G/DL
ALP BLD-CCNC: 79 U/L
ALT SERPL-CCNC: 53 U/L
ANION GAP SERPL CALC-SCNC: 12 MMOL/L
AST SERPL-CCNC: 51 U/L
BASOPHILS # BLD AUTO: 0.03 K/UL
BASOPHILS NFR BLD AUTO: 0.4 %
BILIRUB SERPL-MCNC: 0.5 MG/DL
BUN SERPL-MCNC: 18 MG/DL
CALCIUM SERPL-MCNC: 9.5 MG/DL
CHLORIDE SERPL-SCNC: 101 MMOL/L
CHOLEST SERPL-MCNC: 177 MG/DL
CO2 SERPL-SCNC: 28 MMOL/L
CREAT SERPL-MCNC: 1.02 MG/DL
EGFR: 86 ML/MIN/1.73M2
EOSINOPHIL # BLD AUTO: 0.1 K/UL
EOSINOPHIL NFR BLD AUTO: 1.5 %
ESTIMATED AVERAGE GLUCOSE: 140 MG/DL
GLUCOSE SERPL-MCNC: 124 MG/DL
HBA1C MFR BLD HPLC: 6.5 %
HCT VFR BLD CALC: 48.2 %
HDLC SERPL-MCNC: 59 MG/DL
HGB BLD-MCNC: 16.1 G/DL
IMM GRANULOCYTES NFR BLD AUTO: 0.3 %
LDLC SERPL CALC-MCNC: 94 MG/DL
LYMPHOCYTES # BLD AUTO: 2.64 K/UL
LYMPHOCYTES NFR BLD AUTO: 38.4 %
MAN DIFF?: NORMAL
MCHC RBC-ENTMCNC: 31.3 PG
MCHC RBC-ENTMCNC: 33.4 GM/DL
MCV RBC AUTO: 93.6 FL
MONOCYTES # BLD AUTO: 0.63 K/UL
MONOCYTES NFR BLD AUTO: 9.2 %
NEUTROPHILS # BLD AUTO: 3.45 K/UL
NEUTROPHILS NFR BLD AUTO: 50.2 %
NONHDLC SERPL-MCNC: 118 MG/DL
PLATELET # BLD AUTO: 232 K/UL
POTASSIUM SERPL-SCNC: 4 MMOL/L
PROT SERPL-MCNC: 7.5 G/DL
RBC # BLD: 5.15 M/UL
RBC # FLD: 11.9 %
SODIUM SERPL-SCNC: 140 MMOL/L
TRIGL SERPL-MCNC: 138 MG/DL
TSH SERPL-ACNC: 1.79 UIU/ML
WBC # FLD AUTO: 6.87 K/UL

## 2023-11-03 ENCOUNTER — APPOINTMENT (OUTPATIENT)
Dept: FAMILY MEDICINE | Facility: CLINIC | Age: 58
End: 2023-11-03
Payer: COMMERCIAL

## 2023-11-03 VITALS
HEART RATE: 85 BPM | SYSTOLIC BLOOD PRESSURE: 142 MMHG | HEIGHT: 68 IN | OXYGEN SATURATION: 99 % | BODY MASS INDEX: 27.89 KG/M2 | TEMPERATURE: 98.2 F | DIASTOLIC BLOOD PRESSURE: 82 MMHG | WEIGHT: 184 LBS

## 2023-11-03 PROCEDURE — 99213 OFFICE O/P EST LOW 20 MIN: CPT

## 2023-11-30 ENCOUNTER — NON-APPOINTMENT (OUTPATIENT)
Age: 58
End: 2023-11-30

## 2023-11-30 ENCOUNTER — APPOINTMENT (OUTPATIENT)
Dept: FAMILY MEDICINE | Facility: CLINIC | Age: 58
End: 2023-11-30
Payer: COMMERCIAL

## 2023-11-30 VITALS
DIASTOLIC BLOOD PRESSURE: 90 MMHG | HEART RATE: 90 BPM | BODY MASS INDEX: 27.28 KG/M2 | SYSTOLIC BLOOD PRESSURE: 146 MMHG | OXYGEN SATURATION: 97 % | WEIGHT: 180 LBS | TEMPERATURE: 98 F | HEIGHT: 68 IN

## 2023-11-30 PROCEDURE — 99213 OFFICE O/P EST LOW 20 MIN: CPT

## 2024-01-04 ENCOUNTER — APPOINTMENT (OUTPATIENT)
Dept: FAMILY MEDICINE | Facility: CLINIC | Age: 59
End: 2024-01-04
Payer: COMMERCIAL

## 2024-01-04 VITALS
BODY MASS INDEX: 27.28 KG/M2 | SYSTOLIC BLOOD PRESSURE: 146 MMHG | HEART RATE: 83 BPM | TEMPERATURE: 98 F | HEIGHT: 68 IN | DIASTOLIC BLOOD PRESSURE: 92 MMHG | WEIGHT: 180 LBS | OXYGEN SATURATION: 98 %

## 2024-01-04 PROCEDURE — 99214 OFFICE O/P EST MOD 30 MIN: CPT

## 2024-01-04 RX ORDER — HYDROCHLOROTHIAZIDE 12.5 MG/1
12.5 CAPSULE ORAL
Qty: 90 | Refills: 1 | Status: DISCONTINUED | COMMUNITY
Start: 2023-05-26 | End: 2024-01-04

## 2024-01-04 NOTE — REVIEW OF SYSTEMS
[Fever] : no fever [Fatigue] : no fatigue [Redness] : redness [Earache] : no earache [Chest Pain] : no chest pain [Palpitations] : no palpitations [Shortness Of Breath] : no shortness of breath [Cough] : no cough [Abdominal Pain] : no abdominal pain [Dysuria] : no dysuria [Back Pain] : back pain [Headache] : no headache

## 2024-01-04 NOTE — HISTORY OF PRESENT ILLNESS
[FreeTextEntry1] : f/u HTN and back pain [de-identified] : 58-year-old male for follow-up back pain.  Patient states Ortho ordered MRI.  He does have follow-up appointment tomorrow.  Notes discomfort after long drives.  Trouble bending.  Discomfort after getting up from laying down position.  Hypertension.  Compliant with therapy.  BP borderline.

## 2024-01-04 NOTE — PLAN
[FreeTextEntry1] : 58-year-old for f/u.  Back pain.  Still out of work due to job description.  Patient is able to perform light duty.  MRI results and Ortho follow-up pending.  Continue as needed analgesia.  Hypertension.  Suboptimal.  Continue amlodipine 10 mg daily along with hydrochlorothiazide 25 mg daily.  All questions answered. Patient voiced understanding and in agreement above plan. Return to clinic as recommended.

## 2024-01-17 ENCOUNTER — APPOINTMENT (OUTPATIENT)
Dept: FAMILY MEDICINE | Facility: CLINIC | Age: 59
End: 2024-01-17
Payer: COMMERCIAL

## 2024-01-17 VITALS
HEART RATE: 89 BPM | DIASTOLIC BLOOD PRESSURE: 82 MMHG | TEMPERATURE: 97.9 F | OXYGEN SATURATION: 99 % | WEIGHT: 180 LBS | HEIGHT: 68 IN | BODY MASS INDEX: 27.28 KG/M2 | SYSTOLIC BLOOD PRESSURE: 140 MMHG

## 2024-01-17 PROCEDURE — 99213 OFFICE O/P EST LOW 20 MIN: CPT

## 2024-01-17 NOTE — PLAN
[FreeTextEntry1] : 58-year-old for f/u.  Back pain. Still out of work due to job description. Patient is able to perform light duty. Continue as needed analgesia.  Hypertension. BP improved from prev reading. Continue amlodipine 10 mg daily along with hydrochlorothiazide 25 mg daily for now.  All questions answered. Patient voiced understanding and in agreement above plan. Return to clinic as recommended.

## 2024-01-17 NOTE — HISTORY OF PRESENT ILLNESS
[FreeTextEntry1] : f/u HTN and back pain.   [de-identified] : 58-year-old male for follow-up back pain. MRI showing disc bulging. Ortho eval and advise pt refrain from prolonged sitting/standing. Also advised to refrain from heavy lifting. Discomfort after long drives. Trouble bending. Discomfort after getting up from laying down position.  Hypertension. Compliant with therapy. BP borderline.

## 2024-02-09 ENCOUNTER — APPOINTMENT (OUTPATIENT)
Dept: CARDIOLOGY | Facility: CLINIC | Age: 59
End: 2024-02-09
Payer: COMMERCIAL

## 2024-02-09 VITALS
WEIGHT: 175 LBS | OXYGEN SATURATION: 97 % | BODY MASS INDEX: 26.52 KG/M2 | HEART RATE: 80 BPM | DIASTOLIC BLOOD PRESSURE: 90 MMHG | HEIGHT: 68 IN | RESPIRATION RATE: 14 BRPM | SYSTOLIC BLOOD PRESSURE: 158 MMHG

## 2024-02-09 DIAGNOSIS — R94.31 ABNORMAL ELECTROCARDIOGRAM [ECG] [EKG]: ICD-10-CM

## 2024-02-09 PROCEDURE — 99214 OFFICE O/P EST MOD 30 MIN: CPT

## 2024-02-09 PROCEDURE — 93000 ELECTROCARDIOGRAM COMPLETE: CPT

## 2024-02-09 PROCEDURE — G2211 COMPLEX E/M VISIT ADD ON: CPT

## 2024-02-09 NOTE — ASSESSMENT
[FreeTextEntry1] : 57 year old male with above hx  Diagnosed to moderate obstructive sleep apnea : s/p sleep specialist evaluation , encourage to use cpap machine regularly   Abnormal EKG ( stable T wave inversion ) without chest pain , possible hypertensive heart disease  will obtain echo   DM type II , : advised the patient to follow diet restriction , life style modification including exercise , will continue  him on metformin 500 mg po bid monitor labs   HTN : mildly elevated.  continue  HCTZ 12.5mg daily. still he is taking , (not 25 mg)   encourage the patient to follow low salt diet ,  continue norvasc  10 mg po daily , home bP monitoring   Hyperlipidemia : improved   Labs ordered.  advised the patient to follow life style modification , including exercise , diet restriction , continue  atorvastatin to 20 mg po daily target LDL < 80 .  Low vitamin D : advised to start high dose vitamin D 50 K weekly for 6 weeks previously recommended at last visit, then  1k daily ,    Follow up 4 months

## 2024-02-09 NOTE — CARDIOLOGY SUMMARY
[de-identified] : 2/9/24sinus bradycardia  low Qrs voltage ,  T wave inversion inferior , V4 to V6   ( no significant change from ekg april 2022 )  [de-identified] : 5/19/22 9 METS 90% MPHR  normal nuclear perfusion scan  [de-identified] : 5/23/22   EF 58%

## 2024-02-09 NOTE — HISTORY OF PRESENT ILLNESS
[FreeTextEntry1] : 58 year old male with hx of hypertension hyperlipidemia  abnormal ekg came for follow up says he is feeling well  other than his back pain he developed  few months ago ,  under the treatment   Patient denies any chest pain or shortness of breath or palpitation   His  pressure is borderline high    Patient blood work showed improved lipids on current dose of medication  His ekg  showed  T wave inversion  similar to previous visit ekg   for which patient had  work up done  ,  echo showed normal study , normal exercise stress test ,   his blood work showed hemoglobin A1c  6.5  patient has family hx of DM , lipid profile  LDL 94 very low vitamin D.  Not taking rx vitamin D as prescribed  Sleep study moderate sleep apnea   have evaluated by sleep medicine  , using cpap machine  but not very compliant to it

## 2024-02-29 ENCOUNTER — APPOINTMENT (OUTPATIENT)
Dept: CARDIOLOGY | Facility: CLINIC | Age: 59
End: 2024-02-29
Payer: COMMERCIAL

## 2024-02-29 PROCEDURE — 93306 TTE W/DOPPLER COMPLETE: CPT

## 2024-03-11 ENCOUNTER — APPOINTMENT (OUTPATIENT)
Dept: FAMILY MEDICINE | Facility: CLINIC | Age: 59
End: 2024-03-11
Payer: SELF-PAY

## 2024-03-11 VITALS
TEMPERATURE: 98.2 F | HEIGHT: 68 IN | BODY MASS INDEX: 26.67 KG/M2 | OXYGEN SATURATION: 98 % | DIASTOLIC BLOOD PRESSURE: 74 MMHG | WEIGHT: 176 LBS | HEART RATE: 78 BPM | SYSTOLIC BLOOD PRESSURE: 124 MMHG

## 2024-03-11 DIAGNOSIS — M54.9 DORSALGIA, UNSPECIFIED: ICD-10-CM

## 2024-03-11 PROCEDURE — 99214 OFFICE O/P EST MOD 30 MIN: CPT

## 2024-03-11 PROCEDURE — G2211 COMPLEX E/M VISIT ADD ON: CPT

## 2024-03-11 RX ORDER — HYDROCHLOROTHIAZIDE 25 MG/1
25 TABLET ORAL DAILY
Qty: 90 | Refills: 1 | Status: ACTIVE | COMMUNITY
Start: 2024-01-04 | End: 1900-01-01

## 2024-03-11 NOTE — PLAN
[FreeTextEntry1] : 58-year-old male for low back pain.  Treatment per Ortho.  Hypertension.  Stable.  Continue amlodipine 10 mg daily and hydrochlorothiazide 25 mg daily per cardio.  Diabetes.  Continue metformin 500 mg twice daily.  Repeat A1c.  All questions answered.  Patient voiced understanding and in agreement to plan.  Return to clinic as recommended.

## 2024-03-11 NOTE — HISTORY OF PRESENT ILLNESS
[FreeTextEntry8] : 58-year-old male for follow-up low back pain.  Patient is currently undergoing review for early half-way due to disability.  Seeing Ortho.  Hypertension. Following w/ cardio. Compliant w/ therapy.   Diabetes. On metformin.

## 2024-05-14 ENCOUNTER — APPOINTMENT (OUTPATIENT)
Dept: FAMILY MEDICINE | Facility: CLINIC | Age: 59
End: 2024-05-14
Payer: COMMERCIAL

## 2024-05-14 VITALS
OXYGEN SATURATION: 99 % | SYSTOLIC BLOOD PRESSURE: 120 MMHG | DIASTOLIC BLOOD PRESSURE: 78 MMHG | TEMPERATURE: 97.9 F | HEART RATE: 86 BPM | WEIGHT: 175 LBS | BODY MASS INDEX: 26.52 KG/M2 | HEIGHT: 68 IN

## 2024-05-14 DIAGNOSIS — E55.9 VITAMIN D DEFICIENCY, UNSPECIFIED: ICD-10-CM

## 2024-05-14 DIAGNOSIS — M79.673 PAIN IN UNSPECIFIED FOOT: ICD-10-CM

## 2024-05-14 PROCEDURE — 99214 OFFICE O/P EST MOD 30 MIN: CPT

## 2024-05-14 PROCEDURE — G2211 COMPLEX E/M VISIT ADD ON: CPT | Mod: NC,1L

## 2024-05-14 RX ORDER — METFORMIN ER 500 MG 500 MG/1
500 TABLET ORAL
Qty: 180 | Refills: 1 | Status: ACTIVE | COMMUNITY
Start: 2022-04-22 | End: 1900-01-01

## 2024-05-14 RX ORDER — AMLODIPINE BESYLATE 10 MG/1
10 TABLET ORAL DAILY
Qty: 1 | Refills: 1 | Status: ACTIVE | COMMUNITY
Start: 1900-01-01 | End: 1900-01-01

## 2024-05-14 RX ORDER — ATORVASTATIN CALCIUM 20 MG/1
20 TABLET, FILM COATED ORAL
Qty: 1 | Refills: 1 | Status: ACTIVE | COMMUNITY
Start: 2021-12-24 | End: 1900-01-01

## 2024-05-14 NOTE — PHYSICAL EXAM
[No Acute Distress] : no acute distress [Well Nourished] : well nourished [Well Developed] : well developed [Well-Appearing] : well-appearing [Normal Voice/Communication] : normal voice/communication [No Respiratory Distress] : no respiratory distress  [Normal Rate] : normal rate  [Speech Grossly Normal] : speech grossly normal [Normal Affect] : the affect was normal [Normal Mood] : the mood was normal

## 2024-05-14 NOTE — PLAN
[FreeTextEntry1] : 59-year-old male for foot pain.  Aleve as needed discussed.  Labs as ordered.  Hypertension. Stable. Continue amlodipine 10 mg daily and hydrochlorothiazide 25 mg daily per cardio.  Diabetes. Continue metformin 500 mg twice daily. Repeat A1c.  All questions answered. Patient voiced understanding and in agreement to plan. Return to clinic as recommended.

## 2024-05-17 ENCOUNTER — APPOINTMENT (OUTPATIENT)
Dept: FAMILY MEDICINE | Facility: CLINIC | Age: 59
End: 2024-05-17
Payer: COMMERCIAL

## 2024-05-17 PROCEDURE — 36415 COLL VENOUS BLD VENIPUNCTURE: CPT

## 2024-05-20 LAB
25(OH)D3 SERPL-MCNC: 32.1 NG/ML
ALBUMIN SERPL ELPH-MCNC: 4.7 G/DL
ALP BLD-CCNC: 85 U/L
ALT SERPL-CCNC: 23 U/L
ANION GAP SERPL CALC-SCNC: 12 MMOL/L
AST SERPL-CCNC: 26 U/L
BASOPHILS # BLD AUTO: 0.04 K/UL
BASOPHILS NFR BLD AUTO: 0.4 %
BILIRUB SERPL-MCNC: 0.5 MG/DL
BUN SERPL-MCNC: 16 MG/DL
CALCIUM SERPL-MCNC: 9.6 MG/DL
CHLORIDE SERPL-SCNC: 102 MMOL/L
CO2 SERPL-SCNC: 26 MMOL/L
CREAT SERPL-MCNC: 1.09 MG/DL
EGFR: 79 ML/MIN/1.73M2
EOSINOPHIL # BLD AUTO: 0.12 K/UL
EOSINOPHIL NFR BLD AUTO: 1.2 %
ESTIMATED AVERAGE GLUCOSE: 123 MG/DL
GLUCOSE SERPL-MCNC: 118 MG/DL
HBA1C MFR BLD HPLC: 5.9 %
HCT VFR BLD CALC: 45.4 %
HGB BLD-MCNC: 15.1 G/DL
IMM GRANULOCYTES NFR BLD AUTO: 0.4 %
LYMPHOCYTES # BLD AUTO: 2.57 K/UL
LYMPHOCYTES NFR BLD AUTO: 26.5 %
MAN DIFF?: NORMAL
MCHC RBC-ENTMCNC: 31.3 PG
MCHC RBC-ENTMCNC: 33.3 GM/DL
MCV RBC AUTO: 94.2 FL
MONOCYTES # BLD AUTO: 0.72 K/UL
MONOCYTES NFR BLD AUTO: 7.4 %
NEUTROPHILS # BLD AUTO: 6.21 K/UL
NEUTROPHILS NFR BLD AUTO: 64.1 %
PLATELET # BLD AUTO: 340 K/UL
POTASSIUM SERPL-SCNC: 4.4 MMOL/L
PROT SERPL-MCNC: 7.4 G/DL
RBC # BLD: 4.82 M/UL
RBC # FLD: 13.5 %
SODIUM SERPL-SCNC: 140 MMOL/L
URATE SERPL-MCNC: 9.4 MG/DL
WBC # FLD AUTO: 9.7 K/UL

## 2024-06-06 ENCOUNTER — APPOINTMENT (OUTPATIENT)
Dept: CARDIOLOGY | Facility: CLINIC | Age: 59
End: 2024-06-06
Payer: COMMERCIAL

## 2024-06-06 VITALS
WEIGHT: 172 LBS | OXYGEN SATURATION: 97 % | HEIGHT: 68 IN | HEART RATE: 71 BPM | SYSTOLIC BLOOD PRESSURE: 132 MMHG | DIASTOLIC BLOOD PRESSURE: 84 MMHG | BODY MASS INDEX: 26.07 KG/M2

## 2024-06-06 DIAGNOSIS — E78.5 HYPERLIPIDEMIA, UNSPECIFIED: ICD-10-CM

## 2024-06-06 DIAGNOSIS — I10 ESSENTIAL (PRIMARY) HYPERTENSION: ICD-10-CM

## 2024-06-06 DIAGNOSIS — E11.9 TYPE 2 DIABETES MELLITUS W/OUT COMPLICATIONS: ICD-10-CM

## 2024-06-06 DIAGNOSIS — G47.33 OBSTRUCTIVE SLEEP APNEA (ADULT) (PEDIATRIC): ICD-10-CM

## 2024-06-06 PROCEDURE — 93000 ELECTROCARDIOGRAM COMPLETE: CPT

## 2024-06-06 PROCEDURE — G2211 COMPLEX E/M VISIT ADD ON: CPT | Mod: NC

## 2024-06-06 PROCEDURE — 99214 OFFICE O/P EST MOD 30 MIN: CPT | Mod: 25

## 2024-06-06 NOTE — HISTORY OF PRESENT ILLNESS
[FreeTextEntry1] : 59 year old male with hx of hypertension hyperlipidemia  abnormal ekg chronic back pain , came for follow up says he is feeling ok other than chronic back pain , stopped working ,  Patient denies any chest pain or shortness of breath or palpitation . patient has lost 10 pounds since october 2023   His  pressure is mild elevated  , as per wife his home BP are normal range , has chronic Back pain  Patient blood work showed improved lipids on current dose of medication  His ekg  showed  T wave inversion  similar to previous visit ekg   for which patient had  work up done  ,  echo showed normal study , normal exercise stress test ,   his blood work showed hemoglobin A1c 5.9 patient has family hx of DM , lipid profile  LDL 94 very low vitamin D.  Not taking rx vitamin D as prescribed  Sleep study moderate sleep apnea   have evaluated by sleep medicine  , using cpap machine  but not very compliant to it

## 2024-06-06 NOTE — CARDIOLOGY SUMMARY
[de-identified] : 2/9/24sinus bradycardia  low Qrs voltage ,  T wave inversion inferior , V4 to V6   ( no significant change from ekg april 2022 )  [de-identified] : 5/19/22 9 METS 90% MPHR  normal nuclear perfusion scan  [de-identified] : 5/23/22   EF 58%

## 2024-06-06 NOTE — ASSESSMENT
[FreeTextEntry1] : 59 year old male with above hx  Diagnosed to moderate obstructive sleep apnea : s/p sleep specialist evaluation , encourage to use cpap machine regularly   Abnormal EKG ( stable T wave inversion ) without chest pain , possible hypertensive heart disease  will obtain echo   DM type II , : advised the patient to follow diet restriction , life style modification including exercise , will continue  him on metformin 500 mg po bid monitor labs   HTN : mildly elevated.  normal home BP ,  continue HCTZ 12.5mg daily. still he is taking , (not 25 mg)   encourage the patient to follow low salt diet ,  continue norvasc  10 mg po daily , home bP monitoring   Hyperlipidemia: improved   Labs ordered.  advised the patient to follow life style modification , including exercise , diet restriction , continue  atorvastatin to 20 mg po daily target LDL < 80 .  Low vitamin D : advised to start high dose vitamin D 50 K weekly for 6 weeks previously recommended at last visit, then  1k daily ,    Follow up 4 months

## 2024-10-16 ENCOUNTER — APPOINTMENT (OUTPATIENT)
Dept: INTERNAL MEDICINE | Facility: CLINIC | Age: 59
End: 2024-10-16

## 2024-10-16 ENCOUNTER — APPOINTMENT (OUTPATIENT)
Dept: INTERNAL MEDICINE | Facility: CLINIC | Age: 59
End: 2024-10-16
Payer: COMMERCIAL

## 2024-10-16 VITALS
SYSTOLIC BLOOD PRESSURE: 130 MMHG | BODY MASS INDEX: 25.76 KG/M2 | DIASTOLIC BLOOD PRESSURE: 80 MMHG | WEIGHT: 170 LBS | HEIGHT: 68 IN | OXYGEN SATURATION: 99 % | HEART RATE: 86 BPM | TEMPERATURE: 98 F

## 2024-10-16 DIAGNOSIS — Z00.00 ENCOUNTER FOR GENERAL ADULT MEDICAL EXAMINATION W/OUT ABNORMAL FINDINGS: ICD-10-CM

## 2024-10-16 PROCEDURE — 36415 COLL VENOUS BLD VENIPUNCTURE: CPT

## 2024-10-16 PROCEDURE — 99396 PREV VISIT EST AGE 40-64: CPT

## 2024-10-17 ENCOUNTER — APPOINTMENT (OUTPATIENT)
Dept: CARDIOLOGY | Facility: CLINIC | Age: 59
End: 2024-10-17
Payer: COMMERCIAL

## 2024-10-17 ENCOUNTER — NON-APPOINTMENT (OUTPATIENT)
Age: 59
End: 2024-10-17

## 2024-10-17 VITALS
RESPIRATION RATE: 14 BRPM | DIASTOLIC BLOOD PRESSURE: 80 MMHG | HEART RATE: 78 BPM | HEIGHT: 68 IN | WEIGHT: 176 LBS | OXYGEN SATURATION: 99 % | BODY MASS INDEX: 26.67 KG/M2 | SYSTOLIC BLOOD PRESSURE: 148 MMHG

## 2024-10-17 LAB
25(OH)D3 SERPL-MCNC: 28.8 NG/ML
ALBUMIN SERPL ELPH-MCNC: 4.5 G/DL
ALP BLD-CCNC: 85 U/L
ALT SERPL-CCNC: 28 U/L
ANION GAP SERPL CALC-SCNC: 17 MMOL/L
AST SERPL-CCNC: 27 U/L
BASOPHILS # BLD AUTO: 0.04 K/UL
BASOPHILS NFR BLD AUTO: 0.6 %
BILIRUB SERPL-MCNC: 0.7 MG/DL
BUN SERPL-MCNC: 13 MG/DL
CALCIUM SERPL-MCNC: 9.1 MG/DL
CHLORIDE SERPL-SCNC: 99 MMOL/L
CHOLEST SERPL-MCNC: 193 MG/DL
CO2 SERPL-SCNC: 25 MMOL/L
CREAT SERPL-MCNC: 0.99 MG/DL
CRP SERPL HS-MCNC: 0.34 MG/L
EGFR: 89 ML/MIN/1.73M2
EOSINOPHIL # BLD AUTO: 0.12 K/UL
EOSINOPHIL NFR BLD AUTO: 1.7 %
ESTIMATED AVERAGE GLUCOSE: 131 MG/DL
FERRITIN SERPL-MCNC: 382 NG/ML
GLUCOSE SERPL-MCNC: 135 MG/DL
HBA1C MFR BLD HPLC: 6.2 %
HCT VFR BLD CALC: 47.4 %
HCYS SERPL-MCNC: 6.8 UMOL/L
HDLC SERPL-MCNC: 60 MG/DL
HGB BLD-MCNC: 16.4 G/DL
IMM GRANULOCYTES NFR BLD AUTO: 0.6 %
LDLC SERPL CALC-MCNC: 95 MG/DL
LYMPHOCYTES # BLD AUTO: 3.01 K/UL
LYMPHOCYTES NFR BLD AUTO: 41.5 %
MAN DIFF?: NORMAL
MCHC RBC-ENTMCNC: 31.7 PG
MCHC RBC-ENTMCNC: 34.6 GM/DL
MCV RBC AUTO: 91.7 FL
MONOCYTES # BLD AUTO: 0.46 K/UL
MONOCYTES NFR BLD AUTO: 6.3 %
NEUTROPHILS # BLD AUTO: 3.58 K/UL
NEUTROPHILS NFR BLD AUTO: 49.3 %
NONHDLC SERPL-MCNC: 133 MG/DL
PLATELET # BLD AUTO: 257 K/UL
POTASSIUM SERPL-SCNC: 3.4 MMOL/L
PROT SERPL-MCNC: 7.4 G/DL
PSA FREE FLD-MCNC: 42 %
PSA FREE SERPL-MCNC: 0.37 NG/ML
PSA SERPL-MCNC: 0.89 NG/ML
RBC # BLD: 5.17 M/UL
RBC # FLD: 12.5 %
SODIUM SERPL-SCNC: 141 MMOL/L
T3FREE SERPL-MCNC: 3.58 PG/ML
TRIGL SERPL-MCNC: 223 MG/DL
TSH SERPL-ACNC: 2.44 UIU/ML
VIT B12 SERPL-MCNC: 1115 PG/ML
WBC # FLD AUTO: 7.25 K/UL

## 2024-10-17 PROCEDURE — 93000 ELECTROCARDIOGRAM COMPLETE: CPT

## 2024-10-17 PROCEDURE — 99214 OFFICE O/P EST MOD 30 MIN: CPT

## 2024-10-17 PROCEDURE — G2211 COMPLEX E/M VISIT ADD ON: CPT | Mod: NC

## 2024-10-17 RX ORDER — LOSARTAN POTASSIUM AND HYDROCHLOROTHIAZIDE 12.5; 5 MG/1; MG/1
50-12.5 TABLET ORAL DAILY
Qty: 90 | Refills: 0 | Status: ACTIVE | COMMUNITY
Start: 2024-10-17 | End: 1900-01-01

## 2024-10-23 ENCOUNTER — APPOINTMENT (OUTPATIENT)
Dept: INTERNAL MEDICINE | Facility: CLINIC | Age: 59
End: 2024-10-23
Payer: COMMERCIAL

## 2024-10-23 VITALS
HEART RATE: 86 BPM | OXYGEN SATURATION: 99 % | SYSTOLIC BLOOD PRESSURE: 132 MMHG | DIASTOLIC BLOOD PRESSURE: 76 MMHG | HEIGHT: 68 IN | BODY MASS INDEX: 26.67 KG/M2 | WEIGHT: 176 LBS

## 2024-10-23 DIAGNOSIS — I10 ESSENTIAL (PRIMARY) HYPERTENSION: ICD-10-CM

## 2024-10-23 DIAGNOSIS — E11.9 TYPE 2 DIABETES MELLITUS W/OUT COMPLICATIONS: ICD-10-CM

## 2024-10-23 DIAGNOSIS — G47.33 OBSTRUCTIVE SLEEP APNEA (ADULT) (PEDIATRIC): ICD-10-CM

## 2024-10-23 DIAGNOSIS — M54.9 DORSALGIA, UNSPECIFIED: ICD-10-CM

## 2024-10-23 DIAGNOSIS — E87.6 HYPOKALEMIA: ICD-10-CM

## 2024-10-23 DIAGNOSIS — R94.31 ABNORMAL ELECTROCARDIOGRAM [ECG] [EKG]: ICD-10-CM

## 2024-10-23 DIAGNOSIS — E78.5 HYPERLIPIDEMIA, UNSPECIFIED: ICD-10-CM

## 2024-10-23 DIAGNOSIS — E55.9 VITAMIN D DEFICIENCY, UNSPECIFIED: ICD-10-CM

## 2024-10-23 PROCEDURE — 99214 OFFICE O/P EST MOD 30 MIN: CPT

## 2024-10-23 PROCEDURE — G2211 COMPLEX E/M VISIT ADD ON: CPT | Mod: NC

## 2024-10-23 PROCEDURE — 36415 COLL VENOUS BLD VENIPUNCTURE: CPT

## 2024-10-27 LAB
ANION GAP SERPL CALC-SCNC: 16 MMOL/L
BUN SERPL-MCNC: 15 MG/DL
CALCIUM SERPL-MCNC: 9 MG/DL
CHLORIDE SERPL-SCNC: 98 MMOL/L
CO2 SERPL-SCNC: 24 MMOL/L
CREAT SERPL-MCNC: 0.99 MG/DL
EGFR: 89 ML/MIN/1.73M2
GLUCOSE SERPL-MCNC: 188 MG/DL
POTASSIUM SERPL-SCNC: 3.2 MMOL/L
SODIUM SERPL-SCNC: 138 MMOL/L

## 2024-12-13 ENCOUNTER — NON-APPOINTMENT (OUTPATIENT)
Age: 59
End: 2024-12-13

## 2024-12-13 ENCOUNTER — APPOINTMENT (OUTPATIENT)
Dept: GASTROENTEROLOGY | Facility: CLINIC | Age: 59
End: 2024-12-13
Payer: COMMERCIAL

## 2024-12-13 VITALS
SYSTOLIC BLOOD PRESSURE: 132 MMHG | WEIGHT: 176 LBS | HEIGHT: 68 IN | DIASTOLIC BLOOD PRESSURE: 74 MMHG | BODY MASS INDEX: 26.67 KG/M2

## 2024-12-13 DIAGNOSIS — Z12.11 ENCOUNTER FOR SCREENING FOR MALIGNANT NEOPLASM OF COLON: ICD-10-CM

## 2024-12-13 DIAGNOSIS — K62.5 HEMORRHAGE OF ANUS AND RECTUM: ICD-10-CM

## 2024-12-13 DIAGNOSIS — R10.13 EPIGASTRIC PAIN: ICD-10-CM

## 2024-12-13 PROCEDURE — 99214 OFFICE O/P EST MOD 30 MIN: CPT

## 2024-12-13 RX ORDER — OMEPRAZOLE 20 MG/1
20 CAPSULE, DELAYED RELEASE ORAL
Qty: 60 | Refills: 5 | Status: ACTIVE | COMMUNITY
Start: 2024-12-13 | End: 1900-01-01

## 2025-01-13 ENCOUNTER — RX RENEWAL (OUTPATIENT)
Age: 60
End: 2025-01-13

## 2025-01-16 ENCOUNTER — APPOINTMENT (OUTPATIENT)
Dept: GASTROENTEROLOGY | Facility: CLINIC | Age: 60
End: 2025-01-16
Payer: COMMERCIAL

## 2025-01-16 VITALS
SYSTOLIC BLOOD PRESSURE: 138 MMHG | HEIGHT: 68 IN | BODY MASS INDEX: 26.67 KG/M2 | DIASTOLIC BLOOD PRESSURE: 74 MMHG | WEIGHT: 176 LBS

## 2025-01-16 DIAGNOSIS — Z09 ENCOUNTER FOR FOLLOW-UP EXAMINATION AFTER COMPLETED TREATMENT FOR CONDITIONS OTHER THAN MALIGNANT NEOPLASM: ICD-10-CM

## 2025-01-16 DIAGNOSIS — R10.13 EPIGASTRIC PAIN: ICD-10-CM

## 2025-01-16 PROCEDURE — 99213 OFFICE O/P EST LOW 20 MIN: CPT

## 2025-02-28 ENCOUNTER — RX RENEWAL (OUTPATIENT)
Age: 60
End: 2025-02-28

## 2025-03-06 ENCOUNTER — NON-APPOINTMENT (OUTPATIENT)
Age: 60
End: 2025-03-06

## 2025-03-06 ENCOUNTER — APPOINTMENT (OUTPATIENT)
Dept: CARDIOLOGY | Facility: CLINIC | Age: 60
End: 2025-03-06
Payer: COMMERCIAL

## 2025-03-06 VITALS
DIASTOLIC BLOOD PRESSURE: 86 MMHG | RESPIRATION RATE: 14 BRPM | BODY MASS INDEX: 27.43 KG/M2 | TEMPERATURE: 98 F | OXYGEN SATURATION: 99 % | WEIGHT: 181 LBS | HEIGHT: 68 IN | SYSTOLIC BLOOD PRESSURE: 140 MMHG | HEART RATE: 75 BPM

## 2025-03-06 VITALS
DIASTOLIC BLOOD PRESSURE: 84 MMHG | BODY MASS INDEX: 27.43 KG/M2 | OXYGEN SATURATION: 99 % | WEIGHT: 181 LBS | HEART RATE: 75 BPM | HEIGHT: 68 IN | SYSTOLIC BLOOD PRESSURE: 140 MMHG

## 2025-03-06 DIAGNOSIS — I10 ESSENTIAL (PRIMARY) HYPERTENSION: ICD-10-CM

## 2025-03-06 DIAGNOSIS — R94.31 ABNORMAL ELECTROCARDIOGRAM [ECG] [EKG]: ICD-10-CM

## 2025-03-06 DIAGNOSIS — E11.9 TYPE 2 DIABETES MELLITUS W/OUT COMPLICATIONS: ICD-10-CM

## 2025-03-06 DIAGNOSIS — E78.5 HYPERLIPIDEMIA, UNSPECIFIED: ICD-10-CM

## 2025-03-06 PROCEDURE — G2211 COMPLEX E/M VISIT ADD ON: CPT | Mod: NC

## 2025-03-06 PROCEDURE — 93000 ELECTROCARDIOGRAM COMPLETE: CPT

## 2025-03-06 PROCEDURE — 99214 OFFICE O/P EST MOD 30 MIN: CPT

## 2025-05-29 ENCOUNTER — APPOINTMENT (OUTPATIENT)
Dept: GASTROENTEROLOGY | Facility: AMBULATORY MEDICAL SERVICES | Age: 60
End: 2025-05-29
Payer: COMMERCIAL

## 2025-05-29 ENCOUNTER — RESULT REVIEW (OUTPATIENT)
Age: 60
End: 2025-05-29

## 2025-05-29 PROCEDURE — 43239 EGD BIOPSY SINGLE/MULTIPLE: CPT

## 2025-07-18 ENCOUNTER — APPOINTMENT (OUTPATIENT)
Dept: CARDIOLOGY | Facility: CLINIC | Age: 60
End: 2025-07-18
Payer: COMMERCIAL

## 2025-07-18 VITALS
DIASTOLIC BLOOD PRESSURE: 90 MMHG | HEIGHT: 68 IN | HEART RATE: 78 BPM | OXYGEN SATURATION: 99 % | WEIGHT: 182 LBS | SYSTOLIC BLOOD PRESSURE: 150 MMHG | BODY MASS INDEX: 27.58 KG/M2

## 2025-07-18 LAB
ALBUMIN SERPL ELPH-MCNC: 4.5 G/DL
ALP BLD-CCNC: 72 U/L
ALT SERPL-CCNC: 30 U/L
ANION GAP SERPL CALC-SCNC: 16 MMOL/L
AST SERPL-CCNC: 28 U/L
BASOPHILS # BLD AUTO: 0.04 K/UL
BASOPHILS NFR BLD AUTO: 0.6 %
BILIRUB SERPL-MCNC: 0.5 MG/DL
BUN SERPL-MCNC: 16 MG/DL
CALCIUM SERPL-MCNC: 9.4 MG/DL
CHLORIDE SERPL-SCNC: 102 MMOL/L
CHOLEST SERPL-MCNC: 290 MG/DL
CO2 SERPL-SCNC: 22 MMOL/L
CREAT SERPL-MCNC: 0.97 MG/DL
EGFRCR SERPLBLD CKD-EPI 2021: 90 ML/MIN/1.73M2
EOSINOPHIL # BLD AUTO: 0.14 K/UL
EOSINOPHIL NFR BLD AUTO: 2.2 %
ESTIMATED AVERAGE GLUCOSE: 137 MG/DL
GLUCOSE SERPL-MCNC: 113 MG/DL
HBA1C MFR BLD HPLC: 6.4 %
HCT VFR BLD CALC: 43.9 %
HDLC SERPL-MCNC: 52 MG/DL
HGB BLD-MCNC: 15.1 G/DL
IMM GRANULOCYTES NFR BLD AUTO: 0.6 %
LDLC SERPL-MCNC: 205 MG/DL
LYMPHOCYTES # BLD AUTO: 2.87 K/UL
LYMPHOCYTES NFR BLD AUTO: 44.6 %
MAN DIFF?: NORMAL
MCHC RBC-ENTMCNC: 30.7 PG
MCHC RBC-ENTMCNC: 34.4 G/DL
MCV RBC AUTO: 89.2 FL
MONOCYTES # BLD AUTO: 0.51 K/UL
MONOCYTES NFR BLD AUTO: 7.9 %
NEUTROPHILS # BLD AUTO: 2.84 K/UL
NEUTROPHILS NFR BLD AUTO: 44.1 %
NONHDLC SERPL-MCNC: 238 MG/DL
PLATELET # BLD AUTO: 230 K/UL
POTASSIUM SERPL-SCNC: 4 MMOL/L
PROT SERPL-MCNC: 7.4 G/DL
RBC # BLD: 4.92 M/UL
RBC # FLD: 12.8 %
SODIUM SERPL-SCNC: 140 MMOL/L
TRIGL SERPL-MCNC: 171 MG/DL
TSH SERPL-ACNC: 2.66 UIU/ML
WBC # FLD AUTO: 6.44 K/UL

## 2025-07-18 PROCEDURE — 93000 ELECTROCARDIOGRAM COMPLETE: CPT

## 2025-07-18 PROCEDURE — G2211 COMPLEX E/M VISIT ADD ON: CPT | Mod: NC

## 2025-07-18 PROCEDURE — 99214 OFFICE O/P EST MOD 30 MIN: CPT

## 2025-07-28 DIAGNOSIS — E78.5 HYPERLIPIDEMIA, UNSPECIFIED: ICD-10-CM
